# Patient Record
Sex: MALE | Race: WHITE | Employment: OTHER | URBAN - METROPOLITAN AREA
[De-identification: names, ages, dates, MRNs, and addresses within clinical notes are randomized per-mention and may not be internally consistent; named-entity substitution may affect disease eponyms.]

---

## 2018-06-15 ENCOUNTER — HOSPITAL ENCOUNTER (OUTPATIENT)
Age: 63
Discharge: HOME | End: 2018-06-17

## 2018-06-18 PROCEDURE — 88305 TISSUE EXAM BY PATHOLOGIST: CPT

## 2019-03-18 ENCOUNTER — HOSPITAL ENCOUNTER (OUTPATIENT)
Dept: DIABETES SERVICES | Age: 64
Setting detail: THERAPIES SERIES
Discharge: HOME | End: 2019-03-18
Payer: COMMERCIAL

## 2019-03-18 PROCEDURE — G0109 DIAB MANAGE TRN IND/GROUP: HCPCS | Performed by: DIETITIAN, REGISTERED

## 2019-03-19 ENCOUNTER — HOSPITAL ENCOUNTER (OUTPATIENT)
Dept: DIABETES SERVICES | Age: 64
Setting detail: THERAPIES SERIES
Discharge: HOME | End: 2019-03-19
Payer: COMMERCIAL

## 2019-03-19 PROCEDURE — G0109 DIAB MANAGE TRN IND/GROUP: HCPCS

## 2019-03-20 ENCOUNTER — HOSPITAL ENCOUNTER (OUTPATIENT)
Dept: DIABETES SERVICES | Age: 64
Setting detail: THERAPIES SERIES
Discharge: HOME | End: 2019-03-20
Payer: COMMERCIAL

## 2019-03-20 PROCEDURE — 97804 MEDICAL NUTRITION GROUP: CPT

## 2019-03-20 ASSESSMENT — PATIENT HEALTH QUESTIONNAIRE - PHQ9: SUM OF ALL RESPONSES TO PHQ QUESTIONS 1-9: 1

## 2019-03-26 ENCOUNTER — HOSPITAL ENCOUNTER (OUTPATIENT)
Dept: DIABETES SERVICES | Age: 64
Setting detail: THERAPIES SERIES
Discharge: HOME | End: 2019-03-26
Payer: COMMERCIAL

## 2019-03-26 PROCEDURE — G0109 DIAB MANAGE TRN IND/GROUP: HCPCS | Performed by: DIETITIAN, REGISTERED

## 2019-08-21 ENCOUNTER — TELEPHONE (OUTPATIENT)
Dept: ADMINISTRATIVE | Age: 64
End: 2019-08-21

## 2019-10-29 ENCOUNTER — OFFICE VISIT (OUTPATIENT)
Dept: CARDIOLOGY CLINIC | Age: 64
End: 2019-10-29
Payer: COMMERCIAL

## 2019-10-29 VITALS
WEIGHT: 267 LBS | RESPIRATION RATE: 18 BRPM | SYSTOLIC BLOOD PRESSURE: 114 MMHG | HEART RATE: 62 BPM | HEIGHT: 76 IN | DIASTOLIC BLOOD PRESSURE: 76 MMHG | BODY MASS INDEX: 32.51 KG/M2

## 2019-10-29 DIAGNOSIS — E78.00 PURE HYPERCHOLESTEROLEMIA: ICD-10-CM

## 2019-10-29 DIAGNOSIS — I10 ESSENTIAL HYPERTENSION: ICD-10-CM

## 2019-10-29 DIAGNOSIS — R55 SYNCOPE AND COLLAPSE: Primary | ICD-10-CM

## 2019-10-29 DIAGNOSIS — I25.10 CORONARY ARTERY DISEASE INVOLVING NATIVE HEART WITHOUT ANGINA PECTORIS, UNSPECIFIED VESSEL OR LESION TYPE: ICD-10-CM

## 2019-10-29 PROCEDURE — 93000 ELECTROCARDIOGRAM COMPLETE: CPT | Performed by: INTERNAL MEDICINE

## 2019-10-29 PROCEDURE — 99214 OFFICE O/P EST MOD 30 MIN: CPT | Performed by: INTERNAL MEDICINE

## 2019-10-31 ENCOUNTER — HOSPITAL ENCOUNTER (OUTPATIENT)
Dept: CARDIOLOGY | Age: 64
Discharge: HOME | End: 2019-10-31
Payer: COMMERCIAL

## 2019-10-31 VITALS
BODY MASS INDEX: 32.51 KG/M2 | HEIGHT: 76 IN | DIASTOLIC BLOOD PRESSURE: 68 MMHG | SYSTOLIC BLOOD PRESSURE: 136 MMHG | WEIGHT: 267 LBS | HEART RATE: 75 BPM

## 2019-10-31 DIAGNOSIS — I25.10 CORONARY ARTERY DISEASE INVOLVING NATIVE CORONARY ARTERY OF NATIVE HEART WITHOUT ANGINA PECTORIS: Primary | ICD-10-CM

## 2019-10-31 DIAGNOSIS — I25.10 CORONARY ARTERY DISEASE INVOLVING NATIVE HEART WITHOUT ANGINA PECTORIS, UNSPECIFIED VESSEL OR LESION TYPE: ICD-10-CM

## 2019-10-31 DIAGNOSIS — R55 SYNCOPE AND COLLAPSE: ICD-10-CM

## 2019-10-31 LAB
LV EF: 50 %
LVEF MODALITY: NORMAL

## 2019-10-31 PROCEDURE — 93017 CV STRESS TEST TRACING ONLY: CPT

## 2019-10-31 PROCEDURE — 3430000000 HC RX DIAGNOSTIC RADIOPHARMACEUTICAL: Performed by: INTERNAL MEDICINE

## 2019-10-31 PROCEDURE — A9500 TC99M SESTAMIBI: HCPCS | Performed by: INTERNAL MEDICINE

## 2019-10-31 PROCEDURE — 2580000003 HC RX 258: Performed by: INTERNAL MEDICINE

## 2019-10-31 PROCEDURE — 78452 HT MUSCLE IMAGE SPECT MULT: CPT

## 2019-10-31 RX ORDER — SODIUM CHLORIDE 0.9 % (FLUSH) 0.9 %
10 SYRINGE (ML) INJECTION PRN
Status: DISCONTINUED | OUTPATIENT
Start: 2019-10-31 | End: 2019-11-01 | Stop reason: HOSPADM

## 2019-10-31 RX ADMIN — Medication 10 ML: at 09:24

## 2019-10-31 RX ADMIN — Medication 32.9 MILLICURIE: at 09:23

## 2019-10-31 RX ADMIN — Medication 10.1 MILLICURIE: at 07:07

## 2019-10-31 RX ADMIN — Medication 10 ML: at 07:07

## 2019-11-01 ENCOUNTER — TELEPHONE (OUTPATIENT)
Dept: CARDIOLOGY CLINIC | Age: 64
End: 2019-11-01

## 2020-02-08 ENCOUNTER — APPOINTMENT (OUTPATIENT)
Dept: CT IMAGING | Age: 65
DRG: 418 | End: 2020-02-08
Payer: COMMERCIAL

## 2020-02-08 ENCOUNTER — APPOINTMENT (OUTPATIENT)
Dept: ULTRASOUND IMAGING | Age: 65
DRG: 418 | End: 2020-02-08
Payer: COMMERCIAL

## 2020-02-08 ENCOUNTER — HOSPITAL ENCOUNTER (INPATIENT)
Age: 65
LOS: 3 days | Discharge: HOME | DRG: 418 | End: 2020-02-11
Attending: EMERGENCY MEDICINE | Admitting: INTERNAL MEDICINE
Payer: COMMERCIAL

## 2020-02-08 PROBLEM — K81.0 ACUTE CHOLECYSTITIS: Status: ACTIVE | Noted: 2020-02-08

## 2020-02-08 LAB
ALBUMIN SERPL-MCNC: 4.5 G/DL (ref 3.5–5.2)
ALP BLD-CCNC: 43 U/L (ref 40–129)
ALT SERPL-CCNC: 21 U/L (ref 0–40)
ANION GAP SERPL CALCULATED.3IONS-SCNC: 14 MMOL/L (ref 7–16)
AST SERPL-CCNC: 17 U/L (ref 0–39)
BASOPHILS ABSOLUTE: 0.02 E9/L (ref 0–0.2)
BASOPHILS RELATIVE PERCENT: 0.2 % (ref 0–2)
BILIRUB SERPL-MCNC: 0.9 MG/DL (ref 0–1.2)
BILIRUBIN DIRECT: <0.2 MG/DL (ref 0–0.3)
BILIRUBIN, INDIRECT: NORMAL MG/DL (ref 0–1)
BUN BLDV-MCNC: 14 MG/DL (ref 8–23)
CALCIUM SERPL-MCNC: 9.8 MG/DL (ref 8.6–10.2)
CHLORIDE BLD-SCNC: 97 MMOL/L (ref 98–107)
CO2: 26 MMOL/L (ref 22–29)
CREAT SERPL-MCNC: 1.2 MG/DL (ref 0.7–1.2)
EOSINOPHILS ABSOLUTE: 0.01 E9/L (ref 0.05–0.5)
EOSINOPHILS RELATIVE PERCENT: 0.1 % (ref 0–6)
GFR AFRICAN AMERICAN: >60
GFR NON-AFRICAN AMERICAN: >60 ML/MIN/1.73
GLUCOSE BLD-MCNC: 193 MG/DL (ref 74–99)
HCT VFR BLD CALC: 44.7 % (ref 37–54)
HEMOGLOBIN: 15.2 G/DL (ref 12.5–16.5)
IMMATURE GRANULOCYTES #: 0.08 E9/L
IMMATURE GRANULOCYTES %: 0.6 % (ref 0–5)
LACTIC ACID: 2.4 MMOL/L (ref 0.5–2.2)
LIPASE: 15 U/L (ref 13–60)
LYMPHOCYTES ABSOLUTE: 0.71 E9/L (ref 1.5–4)
LYMPHOCYTES RELATIVE PERCENT: 5.4 % (ref 20–42)
MCH RBC QN AUTO: 28.8 PG (ref 26–35)
MCHC RBC AUTO-ENTMCNC: 34 % (ref 32–34.5)
MCV RBC AUTO: 84.8 FL (ref 80–99.9)
MONOCYTES ABSOLUTE: 0.76 E9/L (ref 0.1–0.95)
MONOCYTES RELATIVE PERCENT: 5.8 % (ref 2–12)
NEUTROPHILS ABSOLUTE: 11.54 E9/L (ref 1.8–7.3)
NEUTROPHILS RELATIVE PERCENT: 87.9 % (ref 43–80)
PDW BLD-RTO: 12.7 FL (ref 11.5–15)
PLATELET # BLD: 178 E9/L (ref 130–450)
PMV BLD AUTO: 9.3 FL (ref 7–12)
POTASSIUM SERPL-SCNC: 4.1 MMOL/L (ref 3.5–5)
RBC # BLD: 5.27 E12/L (ref 3.8–5.8)
SODIUM BLD-SCNC: 137 MMOL/L (ref 132–146)
TOTAL PROTEIN: 7 G/DL (ref 6.4–8.3)
TROPONIN: <0.01 NG/ML (ref 0–0.03)
WBC # BLD: 13.1 E9/L (ref 4.5–11.5)

## 2020-02-08 PROCEDURE — 83690 ASSAY OF LIPASE: CPT

## 2020-02-08 PROCEDURE — 2580000003 HC RX 258: Performed by: SURGERY

## 2020-02-08 PROCEDURE — 6360000002 HC RX W HCPCS: Performed by: INTERNAL MEDICINE

## 2020-02-08 PROCEDURE — 2580000003 HC RX 258: Performed by: EMERGENCY MEDICINE

## 2020-02-08 PROCEDURE — 6360000004 HC RX CONTRAST MEDICATION: Performed by: RADIOLOGY

## 2020-02-08 PROCEDURE — 6360000002 HC RX W HCPCS: Performed by: EMERGENCY MEDICINE

## 2020-02-08 PROCEDURE — 2500000003 HC RX 250 WO HCPCS: Performed by: EMERGENCY MEDICINE

## 2020-02-08 PROCEDURE — 1200000000 HC SEMI PRIVATE

## 2020-02-08 PROCEDURE — 6360000002 HC RX W HCPCS: Performed by: SURGERY

## 2020-02-08 PROCEDURE — 80076 HEPATIC FUNCTION PANEL: CPT

## 2020-02-08 PROCEDURE — 96375 TX/PRO/DX INJ NEW DRUG ADDON: CPT

## 2020-02-08 PROCEDURE — 83605 ASSAY OF LACTIC ACID: CPT

## 2020-02-08 PROCEDURE — 85025 COMPLETE CBC W/AUTO DIFF WBC: CPT

## 2020-02-08 PROCEDURE — 76705 ECHO EXAM OF ABDOMEN: CPT

## 2020-02-08 PROCEDURE — 96374 THER/PROPH/DIAG INJ IV PUSH: CPT

## 2020-02-08 PROCEDURE — 36415 COLL VENOUS BLD VENIPUNCTURE: CPT

## 2020-02-08 PROCEDURE — 80048 BASIC METABOLIC PNL TOTAL CA: CPT

## 2020-02-08 PROCEDURE — 84484 ASSAY OF TROPONIN QUANT: CPT

## 2020-02-08 PROCEDURE — 2500000003 HC RX 250 WO HCPCS: Performed by: SURGERY

## 2020-02-08 PROCEDURE — 2580000003 HC RX 258: Performed by: INTERNAL MEDICINE

## 2020-02-08 PROCEDURE — 2580000003 HC RX 258: Performed by: RADIOLOGY

## 2020-02-08 PROCEDURE — 74177 CT ABD & PELVIS W/CONTRAST: CPT

## 2020-02-08 PROCEDURE — 99285 EMERGENCY DEPT VISIT HI MDM: CPT

## 2020-02-08 RX ORDER — METOCLOPRAMIDE HYDROCHLORIDE 5 MG/ML
10 INJECTION INTRAMUSCULAR; INTRAVENOUS ONCE
Status: COMPLETED | OUTPATIENT
Start: 2020-02-08 | End: 2020-02-08

## 2020-02-08 RX ORDER — FENTANYL CITRATE 50 UG/ML
50 INJECTION, SOLUTION INTRAMUSCULAR; INTRAVENOUS ONCE
Status: COMPLETED | OUTPATIENT
Start: 2020-02-08 | End: 2020-02-08

## 2020-02-08 RX ORDER — DIPHENHYDRAMINE HYDROCHLORIDE 50 MG/ML
12.5 INJECTION INTRAMUSCULAR; INTRAVENOUS ONCE
Status: COMPLETED | OUTPATIENT
Start: 2020-02-08 | End: 2020-02-08

## 2020-02-08 RX ORDER — SODIUM CHLORIDE 9 MG/ML
INJECTION, SOLUTION INTRAVENOUS CONTINUOUS
Status: DISCONTINUED | OUTPATIENT
Start: 2020-02-08 | End: 2020-02-11

## 2020-02-08 RX ORDER — ACETAMINOPHEN 325 MG/1
650 TABLET ORAL EVERY 4 HOURS PRN
Status: DISCONTINUED | OUTPATIENT
Start: 2020-02-08 | End: 2020-02-11 | Stop reason: HOSPADM

## 2020-02-08 RX ORDER — FAMOTIDINE 20 MG/1
20 TABLET, FILM COATED ORAL 2 TIMES DAILY
Status: DISCONTINUED | OUTPATIENT
Start: 2020-02-08 | End: 2020-02-11 | Stop reason: HOSPADM

## 2020-02-08 RX ORDER — ONDANSETRON 2 MG/ML
4 INJECTION INTRAMUSCULAR; INTRAVENOUS EVERY 6 HOURS PRN
Status: DISCONTINUED | OUTPATIENT
Start: 2020-02-08 | End: 2020-02-11 | Stop reason: HOSPADM

## 2020-02-08 RX ORDER — POTASSIUM CHLORIDE 20 MEQ/1
40 TABLET, EXTENDED RELEASE ORAL PRN
Status: DISCONTINUED | OUTPATIENT
Start: 2020-02-08 | End: 2020-02-11 | Stop reason: HOSPADM

## 2020-02-08 RX ORDER — NICOTINE POLACRILEX 4 MG
15 LOZENGE BUCCAL PRN
Status: DISCONTINUED | OUTPATIENT
Start: 2020-02-08 | End: 2020-02-10 | Stop reason: SDUPTHER

## 2020-02-08 RX ORDER — ONDANSETRON 2 MG/ML
8 INJECTION INTRAMUSCULAR; INTRAVENOUS ONCE
Status: COMPLETED | OUTPATIENT
Start: 2020-02-08 | End: 2020-02-08

## 2020-02-08 RX ORDER — POTASSIUM CHLORIDE 7.45 MG/ML
10 INJECTION INTRAVENOUS PRN
Status: DISCONTINUED | OUTPATIENT
Start: 2020-02-08 | End: 2020-02-11 | Stop reason: HOSPADM

## 2020-02-08 RX ORDER — SODIUM CHLORIDE 0.9 % (FLUSH) 0.9 %
10 SYRINGE (ML) INJECTION EVERY 12 HOURS SCHEDULED
Status: DISCONTINUED | OUTPATIENT
Start: 2020-02-08 | End: 2020-02-11 | Stop reason: HOSPADM

## 2020-02-08 RX ORDER — SENNA PLUS 8.6 MG/1
1 TABLET ORAL DAILY PRN
Status: DISCONTINUED | OUTPATIENT
Start: 2020-02-08 | End: 2020-02-11 | Stop reason: HOSPADM

## 2020-02-08 RX ORDER — MORPHINE SULFATE 4 MG/ML
6 INJECTION, SOLUTION INTRAMUSCULAR; INTRAVENOUS ONCE
Status: COMPLETED | OUTPATIENT
Start: 2020-02-08 | End: 2020-02-08

## 2020-02-08 RX ORDER — 0.9 % SODIUM CHLORIDE 0.9 %
1000 INTRAVENOUS SOLUTION INTRAVENOUS ONCE
Status: COMPLETED | OUTPATIENT
Start: 2020-02-08 | End: 2020-02-08

## 2020-02-08 RX ORDER — MORPHINE SULFATE 2 MG/ML
2 INJECTION, SOLUTION INTRAMUSCULAR; INTRAVENOUS
Status: DISCONTINUED | OUTPATIENT
Start: 2020-02-08 | End: 2020-02-11 | Stop reason: HOSPADM

## 2020-02-08 RX ORDER — MORPHINE SULFATE 4 MG/ML
4 INJECTION, SOLUTION INTRAMUSCULAR; INTRAVENOUS EVERY 4 HOURS PRN
Status: DISCONTINUED | OUTPATIENT
Start: 2020-02-08 | End: 2020-02-11 | Stop reason: HOSPADM

## 2020-02-08 RX ORDER — DEXTROSE MONOHYDRATE 50 MG/ML
100 INJECTION, SOLUTION INTRAVENOUS PRN
Status: DISCONTINUED | OUTPATIENT
Start: 2020-02-08 | End: 2020-02-10 | Stop reason: SDUPTHER

## 2020-02-08 RX ORDER — SODIUM CHLORIDE 0.9 % (FLUSH) 0.9 %
10 SYRINGE (ML) INJECTION PRN
Status: DISCONTINUED | OUTPATIENT
Start: 2020-02-08 | End: 2020-02-11 | Stop reason: HOSPADM

## 2020-02-08 RX ORDER — DEXTROSE MONOHYDRATE 25 G/50ML
12.5 INJECTION, SOLUTION INTRAVENOUS PRN
Status: DISCONTINUED | OUTPATIENT
Start: 2020-02-08 | End: 2020-02-10 | Stop reason: SDUPTHER

## 2020-02-08 RX ORDER — SODIUM CHLORIDE 0.9 % (FLUSH) 0.9 %
10 SYRINGE (ML) INJECTION PRN
Status: COMPLETED | OUTPATIENT
Start: 2020-02-08 | End: 2020-02-08

## 2020-02-08 RX ADMIN — METOCLOPRAMIDE 10 MG: 5 INJECTION, SOLUTION INTRAMUSCULAR; INTRAVENOUS at 19:39

## 2020-02-08 RX ADMIN — SODIUM CHLORIDE 1000 ML: 9 INJECTION, SOLUTION INTRAVENOUS at 15:23

## 2020-02-08 RX ADMIN — MORPHINE SULFATE 4 MG: 4 INJECTION, SOLUTION INTRAMUSCULAR; INTRAVENOUS at 19:42

## 2020-02-08 RX ADMIN — Medication 10 ML: at 19:39

## 2020-02-08 RX ADMIN — MORPHINE SULFATE 6 MG: 4 INJECTION, SOLUTION INTRAMUSCULAR; INTRAVENOUS at 15:23

## 2020-02-08 RX ADMIN — FAMOTIDINE 20 MG: 10 INJECTION INTRAVENOUS at 15:23

## 2020-02-08 RX ADMIN — CEFTRIAXONE SODIUM 1 G: 1 INJECTION, POWDER, FOR SOLUTION INTRAMUSCULAR; INTRAVENOUS at 17:55

## 2020-02-08 RX ADMIN — FENTANYL CITRATE 50 MCG: 50 INJECTION, SOLUTION INTRAMUSCULAR; INTRAVENOUS at 17:04

## 2020-02-08 RX ADMIN — IOPAMIDOL 110 ML: 755 INJECTION, SOLUTION INTRAVENOUS at 16:10

## 2020-02-08 RX ADMIN — ONDANSETRON 8 MG: 2 INJECTION INTRAMUSCULAR; INTRAVENOUS at 15:23

## 2020-02-08 RX ADMIN — ENOXAPARIN SODIUM 40 MG: 40 INJECTION SUBCUTANEOUS at 19:39

## 2020-02-08 RX ADMIN — Medication 10 ML: at 16:10

## 2020-02-08 RX ADMIN — METRONIDAZOLE 500 MG: 500 INJECTION, SOLUTION INTRAVENOUS at 19:39

## 2020-02-08 RX ADMIN — SODIUM CHLORIDE: 9 INJECTION, SOLUTION INTRAVENOUS at 19:39

## 2020-02-08 RX ADMIN — DIPHENHYDRAMINE HYDROCHLORIDE 12.5 MG: 50 INJECTION, SOLUTION INTRAMUSCULAR; INTRAVENOUS at 19:39

## 2020-02-08 ASSESSMENT — ENCOUNTER SYMPTOMS
SORE THROAT: 0
EYE REDNESS: 0
COLOR CHANGE: 0
BLOOD IN STOOL: 0
SHORTNESS OF BREATH: 0
ABDOMINAL DISTENTION: 1
RECTAL PAIN: 0
ABDOMINAL PAIN: 1
COUGH: 0
FLATUS: 0
CONSTIPATION: 1
HEMATEMESIS: 0
DIARRHEA: 0
ANAL BLEEDING: 0
NAUSEA: 1
EYE PAIN: 0
CHEST TIGHTNESS: 0
VOMITING: 1
TROUBLE SWALLOWING: 0
HEMATOCHEZIA: 0

## 2020-02-08 ASSESSMENT — PAIN SCALES - GENERAL
PAINLEVEL_OUTOF10: 0
PAINLEVEL_OUTOF10: 6
PAINLEVEL_OUTOF10: 8
PAINLEVEL_OUTOF10: 5
PAINLEVEL_OUTOF10: 8
PAINLEVEL_OUTOF10: 8

## 2020-02-08 ASSESSMENT — PAIN DESCRIPTION - PAIN TYPE
TYPE: ACUTE PAIN

## 2020-02-08 ASSESSMENT — PAIN DESCRIPTION - ONSET
ONSET: ON-GOING
ONSET: ON-GOING

## 2020-02-08 ASSESSMENT — PAIN DESCRIPTION - DESCRIPTORS
DESCRIPTORS: BURNING
DESCRIPTORS: ACHING;CONSTANT;DISCOMFORT

## 2020-02-08 ASSESSMENT — PAIN DESCRIPTION - ORIENTATION
ORIENTATION: RIGHT;LEFT
ORIENTATION: MID;UPPER

## 2020-02-08 ASSESSMENT — PAIN DESCRIPTION - PROGRESSION
CLINICAL_PROGRESSION: NOT CHANGED
CLINICAL_PROGRESSION: GRADUALLY WORSENING

## 2020-02-08 ASSESSMENT — PAIN - FUNCTIONAL ASSESSMENT
PAIN_FUNCTIONAL_ASSESSMENT: PREVENTS OR INTERFERES SOME ACTIVE ACTIVITIES AND ADLS
PAIN_FUNCTIONAL_ASSESSMENT: PREVENTS OR INTERFERES SOME ACTIVE ACTIVITIES AND ADLS

## 2020-02-08 ASSESSMENT — PAIN DESCRIPTION - LOCATION
LOCATION: ABDOMEN
LOCATION: ABDOMEN

## 2020-02-08 ASSESSMENT — PAIN DESCRIPTION - FREQUENCY
FREQUENCY: CONTINUOUS
FREQUENCY: CONTINUOUS

## 2020-02-08 NOTE — ED PROVIDER NOTES
Breath sounds: Normal breath sounds. No wheezing or rales. Abdominal:      General: Bowel sounds are normal. There is no distension or abdominal bruit. Palpations: Abdomen is soft. There is no mass. Tenderness: There is abdominal tenderness in the right upper quadrant and epigastric area. There is no guarding or rebound. Positive signs include Weldon's sign. Negative signs include McBurney's sign. Hernia: No hernia is present. Comments: Very tender to palpation in the epigastric and right upper quadrant region. No guarding, no rigidity, bowel sounds are heard throughout the abdomen   Musculoskeletal: Normal range of motion. Lymphadenopathy:      Cervical: No cervical adenopathy. Skin:     General: Skin is warm and dry. Capillary Refill: Capillary refill takes less than 2 seconds. Coloration: Skin is not pale. Findings: No erythema or rash. Neurological:      Mental Status: He is alert and oriented to person, place, and time. Procedures     MDM     ED Course as of Feb 08 1707   Sat Feb 08, 2020   1643 CT shows pericholecystic stranding and inflammatory changes of the fluid on CT scan. No elevation of hepatic biliary or pancreatic enzymes. No signs of stones or obvious blockage. Findings concerning for possible cholecystitis coupled with his leukocytosis and location of his pain and symptoms. Patient reports seeing a general surgeon remotely for a colonoscopy and he cannot remember the name of this person and is not in the medical records. I discussed options for patient preference are going with PCP preference or on-call surgeon. The patient prefers to go with PCPs preference for general surgeon. [JL]   [id number] Spoke with Dr. Juan Fabian, surgery. Will order antibiotics. Will check US study. Will consult PCP regarding admission. [JL]   26 Spoke with Dr. Gordon Melendez, on-call for PCP. She will admit. [JL]   4598 Patient still in pain.   We will give additional pain medication as patient is going to undergo an ultrasound which may worsen his pain due to pressure ultrasound probe. Discussed plan for antibiotics and admission. Patient and family are agreeable with plan. Abdominal exam remains with tenderness without rigidity or guarding. Patient is nontoxic-appearing. [JL]      ED Course User Index  [JL] Taylor Castillo, DO          --------------------------------------------- PAST HISTORY ---------------------------------------------  Past Medical History:  has a past medical history of Arthritis, Chest discomfort, Depression, Depression, Diabetes mellitus ([address] Dizziness, Hearing loss, History of cardiac cath, History of stress test, Hyperlipemia, Hypertension, and Lightheadedness. Past Surgical History:  has a past surgical history that includes cervical fusion (2003); hernia repair; Knee arthroscopy (Left); other surgical history (3/18/2013); Cardiac catheterization; Colonoscopy; and joint replacement (Left, 2013). Social History:  reports that he quit smoking about 19 years ago. He has a 25.00 pack-year smoking history. He has never used smokeless tobacco. He reports that he does not drink alcohol or use drugs. Family History: family history includes Cancer in his mother; Coronary Art Dis (age of onset: 54) in his brother; Heart Attack (age of onset: 54) in his brother; Other in his father. The patients home medications have been reviewed. Allergies: Patient has no known allergies.     -------------------------------------------------- RESULTS -------------------------------------------------    LABS:  Results for orders placed or performed during the hospital encounter of 02/08/20   CBC auto differential   Result Value Ref Range    WBC 13.1 (H) 4.5 - 11.5 E9/L    RBC 5.27 3.80 - 5.80 E12/L    Hemoglobin 15.2 12.5 - 16.5 g/dL    Hematocrit 44.7 37.0 - 54.0 %    MCV 84.8 80.0 - 99.9 fL    MCH 28.8 26.0 - 35.0 pg    MCHC 34.0 32.0 - 34.5 encounter and vital signs as below have been reviewed. Patient Vitals for the past 24 hrs:   BP Temp Temp src Pulse Resp SpO2 Height Weight   02/08/20 1410 (!) 144/71 98.3 °F (36.8 °C) Oral 90 16 96 % 6' 4\" (1.93 m) 263 lb (119.3 kg)       Oxygen Saturation Interpretation: Normal    ------------------------------------------ PROGRESS NOTES ------------------------------------------      Counseling:  I have spoken with the patient and discussed todays results, in addition to providing specific details for the plan of care and counseling regarding the diagnosis and prognosis. Their questions are answered at this time and they are agreeable with the plan of admission.    --------------------------------- ADDITIONAL PROVIDER NOTES ---------------------------------  Consultations:  Time: 1655. Spoke with Dr. Jad Bang, medicine. Discussed case. They will admit the patient. Spoke with Dr. Claire Nguyen, surgery. He will provide consultation. This patient's ED course included: a personal history and physicial examination, re-evaluation prior to disposition, multiple bedside re-evaluations and IV medications    This patient has remained hemodynamically stable during their ED course. Diagnosis:  1. Acute cholecystitis        Disposition:  Patient's disposition: Admit to med/surg floor  Patient's condition is stable.             Saud Clark DO  Resident  02/08/20 2834

## 2020-02-08 NOTE — ED NOTES
Bed:  GARCIA-  Expected date:   Expected time:   Means of arrival:   Comments:  Dmitriy Larson RN  02/08/20 6515

## 2020-02-09 ENCOUNTER — ANESTHESIA EVENT (OUTPATIENT)
Dept: OPERATING ROOM | Age: 65
DRG: 418 | End: 2020-02-09
Payer: COMMERCIAL

## 2020-02-09 ENCOUNTER — APPOINTMENT (OUTPATIENT)
Dept: GENERAL RADIOLOGY | Age: 65
DRG: 418 | End: 2020-02-09
Payer: COMMERCIAL

## 2020-02-09 ENCOUNTER — APPOINTMENT (OUTPATIENT)
Dept: NUCLEAR MEDICINE | Age: 65
DRG: 418 | End: 2020-02-09
Payer: COMMERCIAL

## 2020-02-09 ENCOUNTER — ANESTHESIA (OUTPATIENT)
Dept: OPERATING ROOM | Age: 65
DRG: 418 | End: 2020-02-09
Payer: COMMERCIAL

## 2020-02-09 VITALS — DIASTOLIC BLOOD PRESSURE: 73 MMHG | SYSTOLIC BLOOD PRESSURE: 131 MMHG | OXYGEN SATURATION: 97 % | TEMPERATURE: 101.1 F

## 2020-02-09 LAB
ALBUMIN SERPL-MCNC: 3.7 G/DL (ref 3.5–5.2)
ALP BLD-CCNC: 45 U/L (ref 40–129)
ALT SERPL-CCNC: 189 U/L (ref 0–40)
ANION GAP SERPL CALCULATED.3IONS-SCNC: 11 MMOL/L (ref 7–16)
APTT: 26.7 SEC (ref 24.5–35.1)
AST SERPL-CCNC: 179 U/L (ref 0–39)
BASOPHILS ABSOLUTE: 0.03 E9/L (ref 0–0.2)
BASOPHILS RELATIVE PERCENT: 0.3 % (ref 0–2)
BILIRUB SERPL-MCNC: 1.8 MG/DL (ref 0–1.2)
BUN BLDV-MCNC: 11 MG/DL (ref 8–23)
CALCIUM SERPL-MCNC: 8.8 MG/DL (ref 8.6–10.2)
CHLORIDE BLD-SCNC: 97 MMOL/L (ref 98–107)
CO2: 26 MMOL/L (ref 22–29)
CREAT SERPL-MCNC: 1.3 MG/DL (ref 0.7–1.2)
EOSINOPHILS ABSOLUTE: 0 E9/L (ref 0.05–0.5)
EOSINOPHILS RELATIVE PERCENT: 0 % (ref 0–6)
GFR AFRICAN AMERICAN: >60
GFR NON-AFRICAN AMERICAN: 56 ML/MIN/1.73
GLUCOSE BLD-MCNC: 183 MG/DL (ref 74–99)
HCT VFR BLD CALC: 43.1 % (ref 37–54)
HEMOGLOBIN: 14.3 G/DL (ref 12.5–16.5)
IMMATURE GRANULOCYTES #: 0.05 E9/L
IMMATURE GRANULOCYTES %: 0.5 % (ref 0–5)
INR BLD: 1.4
LACTIC ACID: 2 MMOL/L (ref 0.5–2.2)
LYMPHOCYTES ABSOLUTE: 0.99 E9/L (ref 1.5–4)
LYMPHOCYTES RELATIVE PERCENT: 9.1 % (ref 20–42)
MCH RBC QN AUTO: 28.7 PG (ref 26–35)
MCHC RBC AUTO-ENTMCNC: 33.2 % (ref 32–34.5)
MCV RBC AUTO: 86.4 FL (ref 80–99.9)
METER GLUCOSE: 151 MG/DL (ref 74–99)
METER GLUCOSE: 170 MG/DL (ref 74–99)
MONOCYTES ABSOLUTE: 0.89 E9/L (ref 0.1–0.95)
MONOCYTES RELATIVE PERCENT: 8.2 % (ref 2–12)
NEUTROPHILS ABSOLUTE: 8.87 E9/L (ref 1.8–7.3)
NEUTROPHILS RELATIVE PERCENT: 81.9 % (ref 43–80)
PDW BLD-RTO: 13.1 FL (ref 11.5–15)
PLATELET # BLD: 178 E9/L (ref 130–450)
PMV BLD AUTO: 9.4 FL (ref 7–12)
POTASSIUM REFLEX MAGNESIUM: 4.2 MMOL/L (ref 3.5–5)
PROTHROMBIN TIME: 15.6 SEC (ref 9.3–12.4)
RBC # BLD: 4.99 E12/L (ref 3.8–5.8)
SODIUM BLD-SCNC: 134 MMOL/L (ref 132–146)
TOTAL PROTEIN: 6.3 G/DL (ref 6.4–8.3)
WBC # BLD: 10.8 E9/L (ref 4.5–11.5)

## 2020-02-09 PROCEDURE — 7100000000 HC PACU RECOVERY - FIRST 15 MIN: Performed by: SURGERY

## 2020-02-09 PROCEDURE — 3700000000 HC ANESTHESIA ATTENDED CARE: Performed by: SURGERY

## 2020-02-09 PROCEDURE — 0FT44ZZ RESECTION OF GALLBLADDER, PERCUTANEOUS ENDOSCOPIC APPROACH: ICD-10-PCS | Performed by: SURGERY

## 2020-02-09 PROCEDURE — 7100000001 HC PACU RECOVERY - ADDTL 15 MIN: Performed by: SURGERY

## 2020-02-09 PROCEDURE — 85610 PROTHROMBIN TIME: CPT

## 2020-02-09 PROCEDURE — 83605 ASSAY OF LACTIC ACID: CPT

## 2020-02-09 PROCEDURE — 85730 THROMBOPLASTIN TIME PARTIAL: CPT

## 2020-02-09 PROCEDURE — 6360000002 HC RX W HCPCS: Performed by: SURGERY

## 2020-02-09 PROCEDURE — 2700000000 HC OXYGEN THERAPY PER DAY

## 2020-02-09 PROCEDURE — 82962 GLUCOSE BLOOD TEST: CPT

## 2020-02-09 PROCEDURE — 78226 HEPATOBILIARY SYSTEM IMAGING: CPT

## 2020-02-09 PROCEDURE — 80053 COMPREHEN METABOLIC PANEL: CPT

## 2020-02-09 PROCEDURE — 2720000010 HC SURG SUPPLY STERILE: Performed by: SURGERY

## 2020-02-09 PROCEDURE — 36415 COLL VENOUS BLD VENIPUNCTURE: CPT

## 2020-02-09 PROCEDURE — 3600000014 HC SURGERY LEVEL 4 ADDTL 15MIN: Performed by: SURGERY

## 2020-02-09 PROCEDURE — 2500000003 HC RX 250 WO HCPCS: Performed by: NURSE ANESTHETIST, CERTIFIED REGISTERED

## 2020-02-09 PROCEDURE — 6370000000 HC RX 637 (ALT 250 FOR IP): Performed by: INTERNAL MEDICINE

## 2020-02-09 PROCEDURE — 1200000000 HC SEMI PRIVATE

## 2020-02-09 PROCEDURE — 6360000002 HC RX W HCPCS: Performed by: INTERNAL MEDICINE

## 2020-02-09 PROCEDURE — 87081 CULTURE SCREEN ONLY: CPT

## 2020-02-09 PROCEDURE — 2580000003 HC RX 258: Performed by: SURGERY

## 2020-02-09 PROCEDURE — 3600000004 HC SURGERY LEVEL 4 BASE: Performed by: SURGERY

## 2020-02-09 PROCEDURE — A9537 TC99M MEBROFENIN: HCPCS | Performed by: RADIOLOGY

## 2020-02-09 PROCEDURE — 2500000003 HC RX 250 WO HCPCS: Performed by: SURGERY

## 2020-02-09 PROCEDURE — 88304 TISSUE EXAM BY PATHOLOGIST: CPT

## 2020-02-09 PROCEDURE — 2580000003 HC RX 258: Performed by: INTERNAL MEDICINE

## 2020-02-09 PROCEDURE — 2580000003 HC RX 258: Performed by: NURSE ANESTHETIST, CERTIFIED REGISTERED

## 2020-02-09 PROCEDURE — 0W9F40Z DRAINAGE OF ABDOMINAL WALL WITH DRAINAGE DEVICE, PERCUTANEOUS ENDOSCOPIC APPROACH: ICD-10-PCS | Performed by: SURGERY

## 2020-02-09 PROCEDURE — 6360000002 HC RX W HCPCS: Performed by: NURSE ANESTHETIST, CERTIFIED REGISTERED

## 2020-02-09 PROCEDURE — 2709999900 HC NON-CHARGEABLE SUPPLY: Performed by: SURGERY

## 2020-02-09 PROCEDURE — 3430000000 HC RX DIAGNOSTIC RADIOPHARMACEUTICAL: Performed by: RADIOLOGY

## 2020-02-09 PROCEDURE — 85025 COMPLETE CBC W/AUTO DIFF WBC: CPT

## 2020-02-09 PROCEDURE — 3700000001 HC ADD 15 MINUTES (ANESTHESIA): Performed by: SURGERY

## 2020-02-09 RX ORDER — FENTANYL CITRATE 50 UG/ML
INJECTION, SOLUTION INTRAMUSCULAR; INTRAVENOUS PRN
Status: DISCONTINUED | OUTPATIENT
Start: 2020-02-09 | End: 2020-02-09 | Stop reason: SDUPTHER

## 2020-02-09 RX ORDER — DEXTROSE, SODIUM CHLORIDE, AND POTASSIUM CHLORIDE 5; .45; .15 G/100ML; G/100ML; G/100ML
INJECTION INTRAVENOUS CONTINUOUS
Status: DISCONTINUED | OUTPATIENT
Start: 2020-02-09 | End: 2020-02-11

## 2020-02-09 RX ORDER — SODIUM CHLORIDE 0.9 % (FLUSH) 0.9 %
10 SYRINGE (ML) INJECTION PRN
Status: DISCONTINUED | OUTPATIENT
Start: 2020-02-09 | End: 2020-02-10 | Stop reason: SDUPTHER

## 2020-02-09 RX ORDER — SODIUM CHLORIDE 0.9 % (FLUSH) 0.9 %
10 SYRINGE (ML) INJECTION EVERY 12 HOURS SCHEDULED
Status: DISCONTINUED | OUTPATIENT
Start: 2020-02-09 | End: 2020-02-10 | Stop reason: SDUPTHER

## 2020-02-09 RX ORDER — PROPOFOL 10 MG/ML
INJECTION, EMULSION INTRAVENOUS PRN
Status: DISCONTINUED | OUTPATIENT
Start: 2020-02-09 | End: 2020-02-09 | Stop reason: SDUPTHER

## 2020-02-09 RX ORDER — MIDAZOLAM HYDROCHLORIDE 1 MG/ML
INJECTION INTRAMUSCULAR; INTRAVENOUS PRN
Status: DISCONTINUED | OUTPATIENT
Start: 2020-02-09 | End: 2020-02-09 | Stop reason: SDUPTHER

## 2020-02-09 RX ORDER — DEXAMETHASONE SODIUM PHOSPHATE 4 MG/ML
INJECTION, SOLUTION INTRA-ARTICULAR; INTRALESIONAL; INTRAMUSCULAR; INTRAVENOUS; SOFT TISSUE PRN
Status: DISCONTINUED | OUTPATIENT
Start: 2020-02-09 | End: 2020-02-09 | Stop reason: SDUPTHER

## 2020-02-09 RX ORDER — FENTANYL CITRATE 50 UG/ML
25 INJECTION, SOLUTION INTRAMUSCULAR; INTRAVENOUS EVERY 5 MIN PRN
Status: DISCONTINUED | OUTPATIENT
Start: 2020-02-09 | End: 2020-02-09 | Stop reason: HOSPADM

## 2020-02-09 RX ORDER — ROCURONIUM BROMIDE 10 MG/ML
INJECTION, SOLUTION INTRAVENOUS PRN
Status: DISCONTINUED | OUTPATIENT
Start: 2020-02-09 | End: 2020-02-09 | Stop reason: SDUPTHER

## 2020-02-09 RX ORDER — ONDANSETRON 2 MG/ML
INJECTION INTRAMUSCULAR; INTRAVENOUS PRN
Status: DISCONTINUED | OUTPATIENT
Start: 2020-02-09 | End: 2020-02-09 | Stop reason: SDUPTHER

## 2020-02-09 RX ORDER — HYDROCODONE BITARTRATE AND ACETAMINOPHEN 5; 325 MG/1; MG/1
1 TABLET ORAL EVERY 6 HOURS PRN
Status: DISCONTINUED | OUTPATIENT
Start: 2020-02-09 | End: 2020-02-11 | Stop reason: HOSPADM

## 2020-02-09 RX ORDER — SODIUM CHLORIDE, SODIUM LACTATE, POTASSIUM CHLORIDE, CALCIUM CHLORIDE 600; 310; 30; 20 MG/100ML; MG/100ML; MG/100ML; MG/100ML
INJECTION, SOLUTION INTRAVENOUS CONTINUOUS PRN
Status: DISCONTINUED | OUTPATIENT
Start: 2020-02-09 | End: 2020-02-09 | Stop reason: SDUPTHER

## 2020-02-09 RX ORDER — LIDOCAINE HYDROCHLORIDE 20 MG/ML
INJECTION, SOLUTION EPIDURAL; INFILTRATION; INTRACAUDAL; PERINEURAL PRN
Status: DISCONTINUED | OUTPATIENT
Start: 2020-02-09 | End: 2020-02-09 | Stop reason: SDUPTHER

## 2020-02-09 RX ORDER — ONDANSETRON 2 MG/ML
4 INJECTION INTRAMUSCULAR; INTRAVENOUS EVERY 6 HOURS PRN
Status: DISCONTINUED | OUTPATIENT
Start: 2020-02-09 | End: 2020-02-10 | Stop reason: SDUPTHER

## 2020-02-09 RX ADMIN — SODIUM CHLORIDE, POTASSIUM CHLORIDE, SODIUM LACTATE AND CALCIUM CHLORIDE: 600; 310; 30; 20 INJECTION, SOLUTION INTRAVENOUS at 15:05

## 2020-02-09 RX ADMIN — MORPHINE SULFATE 4 MG: 4 INJECTION, SOLUTION INTRAMUSCULAR; INTRAVENOUS at 09:43

## 2020-02-09 RX ADMIN — LIDOCAINE HYDROCHLORIDE 60 MG: 20 INJECTION, SOLUTION EPIDURAL; INFILTRATION; INTRACAUDAL; PERINEURAL at 15:09

## 2020-02-09 RX ADMIN — DEXAMETHASONE SODIUM PHOSPHATE 8 MG: 4 INJECTION, SOLUTION INTRAMUSCULAR; INTRAVENOUS at 15:15

## 2020-02-09 RX ADMIN — Medication 10 ML: at 20:05

## 2020-02-09 RX ADMIN — POTASSIUM CHLORIDE, DEXTROSE MONOHYDRATE AND SODIUM CHLORIDE: 150; 5; 450 INJECTION, SOLUTION INTRAVENOUS at 17:16

## 2020-02-09 RX ADMIN — Medication 10 ML: at 09:43

## 2020-02-09 RX ADMIN — FENTANYL CITRATE 50 MCG: 50 INJECTION, SOLUTION INTRAMUSCULAR; INTRAVENOUS at 15:09

## 2020-02-09 RX ADMIN — ONDANSETRON HYDROCHLORIDE 4 MG: 2 INJECTION, SOLUTION INTRAMUSCULAR; INTRAVENOUS at 15:15

## 2020-02-09 RX ADMIN — MIDAZOLAM 2 MG: 1 INJECTION INTRAMUSCULAR; INTRAVENOUS at 15:09

## 2020-02-09 RX ADMIN — METRONIDAZOLE 500 MG: 500 INJECTION, SOLUTION INTRAVENOUS at 10:25

## 2020-02-09 RX ADMIN — METRONIDAZOLE 500 MG: 500 INJECTION, SOLUTION INTRAVENOUS at 17:17

## 2020-02-09 RX ADMIN — HYDROMORPHONE HYDROCHLORIDE 1 MG: 1 INJECTION, SOLUTION INTRAMUSCULAR; INTRAVENOUS; SUBCUTANEOUS at 17:23

## 2020-02-09 RX ADMIN — ROCURONIUM BROMIDE 50 MG: 10 SOLUTION INTRAVENOUS at 15:09

## 2020-02-09 RX ADMIN — MORPHINE SULFATE 4 MG: 4 INJECTION, SOLUTION INTRAMUSCULAR; INTRAVENOUS at 20:05

## 2020-02-09 RX ADMIN — CEFTRIAXONE 1 G: 1 INJECTION, POWDER, FOR SOLUTION INTRAMUSCULAR; INTRAVENOUS at 18:20

## 2020-02-09 RX ADMIN — Medication 6 MILLICURIE: at 08:47

## 2020-02-09 RX ADMIN — Medication 10 ML: at 17:23

## 2020-02-09 RX ADMIN — FAMOTIDINE 20 MG: 20 TABLET, FILM COATED ORAL at 20:05

## 2020-02-09 RX ADMIN — FENTANYL CITRATE 100 MCG: 50 INJECTION, SOLUTION INTRAMUSCULAR; INTRAVENOUS at 16:10

## 2020-02-09 RX ADMIN — SUGAMMADEX 400 MG: 100 INJECTION, SOLUTION INTRAVENOUS at 15:57

## 2020-02-09 RX ADMIN — FENTANYL CITRATE 100 MCG: 50 INJECTION, SOLUTION INTRAMUSCULAR; INTRAVENOUS at 15:15

## 2020-02-09 RX ADMIN — ENOXAPARIN SODIUM 40 MG: 40 INJECTION SUBCUTANEOUS at 17:27

## 2020-02-09 RX ADMIN — METRONIDAZOLE 500 MG: 500 INJECTION, SOLUTION INTRAVENOUS at 03:18

## 2020-02-09 RX ADMIN — MORPHINE SULFATE 4 MG: 4 INJECTION, SOLUTION INTRAMUSCULAR; INTRAVENOUS at 01:22

## 2020-02-09 RX ADMIN — PROPOFOL 200 MG: 10 INJECTION, EMULSION INTRAVENOUS at 15:09

## 2020-02-09 ASSESSMENT — PULMONARY FUNCTION TESTS
PIF_VALUE: 1
PIF_VALUE: 20
PIF_VALUE: 23
PIF_VALUE: 2
PIF_VALUE: 24
PIF_VALUE: 20
PIF_VALUE: 24
PIF_VALUE: 5
PIF_VALUE: 19
PIF_VALUE: 23
PIF_VALUE: 1
PIF_VALUE: 29
PIF_VALUE: 2
PIF_VALUE: 0
PIF_VALUE: 17
PIF_VALUE: 22
PIF_VALUE: 1
PIF_VALUE: 1
PIF_VALUE: 0
PIF_VALUE: 15
PIF_VALUE: 24
PIF_VALUE: 1
PIF_VALUE: 23
PIF_VALUE: 24
PIF_VALUE: 23
PIF_VALUE: 24
PIF_VALUE: 1
PIF_VALUE: 23
PIF_VALUE: 18
PIF_VALUE: 2
PIF_VALUE: 1
PIF_VALUE: 17
PIF_VALUE: 1
PIF_VALUE: 4
PIF_VALUE: 1
PIF_VALUE: 17
PIF_VALUE: 26
PIF_VALUE: 1
PIF_VALUE: 1
PIF_VALUE: 22
PIF_VALUE: 24
PIF_VALUE: 3
PIF_VALUE: 2
PIF_VALUE: 17
PIF_VALUE: 2
PIF_VALUE: 4
PIF_VALUE: 1
PIF_VALUE: 17
PIF_VALUE: 1
PIF_VALUE: 1
PIF_VALUE: 2
PIF_VALUE: 19
PIF_VALUE: 23
PIF_VALUE: 0
PIF_VALUE: 24
PIF_VALUE: 23
PIF_VALUE: 22
PIF_VALUE: 23
PIF_VALUE: 22
PIF_VALUE: 24
PIF_VALUE: 22
PIF_VALUE: 22
PIF_VALUE: 1
PIF_VALUE: 13
PIF_VALUE: 23
PIF_VALUE: 1
PIF_VALUE: 1
PIF_VALUE: 22
PIF_VALUE: 22
PIF_VALUE: 1
PIF_VALUE: 24
PIF_VALUE: 2
PIF_VALUE: 2
PIF_VALUE: 1
PIF_VALUE: 1
PIF_VALUE: 24

## 2020-02-09 ASSESSMENT — PAIN DESCRIPTION - DESCRIPTORS
DESCRIPTORS: ACHING;DISCOMFORT;SORE
DESCRIPTORS: ACHING;BURNING
DESCRIPTORS: ACHING;BURNING;DISCOMFORT
DESCRIPTORS: ACHING;DISCOMFORT;SORE

## 2020-02-09 ASSESSMENT — PAIN SCALES - GENERAL
PAINLEVEL_OUTOF10: 10
PAINLEVEL_OUTOF10: 8
PAINLEVEL_OUTOF10: 2
PAINLEVEL_OUTOF10: 8
PAINLEVEL_OUTOF10: 8
PAINLEVEL_OUTOF10: 2
PAINLEVEL_OUTOF10: 0
PAINLEVEL_OUTOF10: 2

## 2020-02-09 ASSESSMENT — PAIN DESCRIPTION - LOCATION
LOCATION: ABDOMEN
LOCATION: ABDOMEN
LOCATION: ABDOMEN;SHOULDER
LOCATION: ABDOMEN
LOCATION: BACK

## 2020-02-09 ASSESSMENT — PAIN DESCRIPTION - PAIN TYPE
TYPE: CHRONIC PAIN
TYPE: ACUTE PAIN
TYPE: SURGICAL PAIN
TYPE: ACUTE PAIN

## 2020-02-09 ASSESSMENT — ENCOUNTER SYMPTOMS
CONSTIPATION: 1
BACK PAIN: 1
BLOOD IN STOOL: 0
SHORTNESS OF BREATH: 0
SORE THROAT: 0
EYE PAIN: 0
VOMITING: 1
ABDOMINAL PAIN: 1
DIARRHEA: 0
EYE ITCHING: 0
APNEA: 0
CHEST TIGHTNESS: 0
WHEEZING: 0
NAUSEA: 1
SINUS PRESSURE: 0

## 2020-02-09 ASSESSMENT — PAIN DESCRIPTION - ONSET
ONSET: ON-GOING

## 2020-02-09 ASSESSMENT — LIFESTYLE VARIABLES: SMOKING_STATUS: 0

## 2020-02-09 ASSESSMENT — PAIN - FUNCTIONAL ASSESSMENT
PAIN_FUNCTIONAL_ASSESSMENT: PREVENTS OR INTERFERES SOME ACTIVE ACTIVITIES AND ADLS
PAIN_FUNCTIONAL_ASSESSMENT: ACTIVITIES ARE NOT PREVENTED

## 2020-02-09 ASSESSMENT — PAIN DESCRIPTION - PROGRESSION
CLINICAL_PROGRESSION: NOT CHANGED

## 2020-02-09 ASSESSMENT — PAIN DESCRIPTION - ORIENTATION
ORIENTATION: MID
ORIENTATION: MID
ORIENTATION: MID;UPPER

## 2020-02-09 ASSESSMENT — PAIN DESCRIPTION - FREQUENCY
FREQUENCY: CONTINUOUS

## 2020-02-09 NOTE — CONSULTS
ascites. Adrenals not enlarged. Kidneys have normal size and cortical thickness and excretion of the contrast. Aorta and IVC appear unremarkable. The ureters are patent. Unremarkable appearance for the bladder, prostate gland seminal vesicles are. Lower lung bases demonstrate no significant findings. The Bone structures demonstrate degenerative changes in the thoracolumbar spine. Findings compatible with acute cholecystitis. Please correlate clinically. Us Gallbladder Ruq    Result Date: 2/8/2020  Clinical indication: Epigastric pain. Acute cholecystitis. COMPARISON: CT abdomen and pelvis February 8, 2020. TECHNIQUE: Real-time and Doppler sonography of the gallbladder was carried out. FINDINGS: Dependent echogenic density within the gallbladder measures 6 mm. Gallbladder wall thickness is normal at 1.2 mm. There is mild prominence of the hepatobiliary tree with common duct measuring 6.7 mm in width. Sonographic Remona Dread sign is negative. Pancreas is not seen. There is no evidence of free fluid or pleural effusion. Cholelithiasis. Borderline dilation of the hepatobiliary tree. No other abnormalities are demonstrated on ultrasound of the gallbladder. 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 If ERCP indicated, to be scheduled. Procedure details for ERCP drawn in detail. Complications including but not limited to pancreatitis, perforation, bleeding or infection are discussed in great detail. Risks, benefits, and alternatives have been explained. Pt has understood the information and has agreed to proceed.   · NPO awaiting HIDA results  · Possible Mary with IOC today per surgery  · Medicate for pain and nausea as ordered per PCP  · Continue Rocephin and Flagyl as ordered per Surgery  · Senna as ordered per PCP  · IV fluids as ordered  · Monitor CMP, CBC, Swapna, Lip  · Continue to monitor    Thank you very much for your consultation. We will follow closely with you. Discussed with Dr. Mark Thomas developed by Dr. Sagar Stephen [id number], FNP-BC 2/9/2020 12:46 PM for Dr. Paola Hart. IN OR FOR CHOLY. WILL FOLLOW.

## 2020-02-09 NOTE — H&P
problems. *    Blood pressure 120/70, pulse 67, temperature 98.7 °F (37.1 °C), temperature source Oral, resp. rate 18, height 6' 4\" (1.93 m), weight 263 lb (119.3 kg), SpO2 97 %. Review of Systems   Constitutional: Positive for activity change. Negative for chills, fatigue and fever. HENT: Negative for mouth sores, sinus pressure and sore throat. Eyes: Negative for pain and itching. Respiratory: Negative for apnea, chest tightness, shortness of breath and wheezing. Cardiovascular: Negative for chest pain, palpitations and leg swelling. Gastrointestinal: Positive for abdominal pain, constipation, nausea and vomiting. Negative for blood in stool and diarrhea. Endocrine: Negative for cold intolerance and heat intolerance. Genitourinary: Negative for dysuria, frequency and hematuria. Musculoskeletal: Positive for back pain. Negative for joint swelling. Skin: Negative for rash and wound. Allergic/Immunologic: Negative for environmental allergies and food allergies. Neurological: Negative for facial asymmetry, light-headedness and numbness. Psychiatric/Behavioral: Negative for behavioral problems and confusion. Physical Exam  Constitutional:       General: He is not in acute distress. Appearance: Normal appearance. He is obese. HENT:      Head: Normocephalic and atraumatic. Right Ear: External ear normal. There is no impacted cerumen. Left Ear: External ear normal. There is no impacted cerumen. Nose: Nose normal.      Mouth/Throat:      Mouth: Mucous membranes are moist.      Pharynx: No oropharyngeal exudate. Eyes:      General:         Right eye: No discharge. Left eye: No discharge. Pupils: Pupils are equal, round, and reactive to light. Neck:      Musculoskeletal: Normal range of motion and neck supple. Cardiovascular:      Rate and Rhythm: Normal rate and regular rhythm. Heart sounds: No murmur.    Pulmonary:      Effort: Pulmonary effort

## 2020-02-09 NOTE — ANESTHESIA POSTPROCEDURE EVALUATION
Department of Anesthesiology  Postprocedure Note    Patient: Will Flaherty  MRN: [id number]  YOB: 1955  Date of evaluation: 2/9/2020  Time:  6:21 PM     Procedure Summary     Date:  02/09/20 Room / Location:  [facility] 01 / [facility]    Anesthesia Start:  3314 Anesthesia Stop:  0405    Procedures:       CHOLECYSTECTOMY LAPAROSCOPIC (N/A Abdomen)      lap cholecystectomy Diagnosis:  (cholelithiasis)    Surgeon:  Gabo Navarro MD Responsible Provider:  Matthias Villavicencio MD    Anesthesia Type:  general ASA Status:  3          Anesthesia Type: general    [place] Phase I: [place] Score: 9    [place] Phase II:      Last vitals: Reviewed and per EMR flowsheets.        Anesthesia Post Evaluation    Patient location during evaluation: PACU  Level of consciousness: awake  Airway patency: patent  Nausea & Vomiting: no nausea and no vomiting  Complications: no  Cardiovascular status: hemodynamically stable  Respiratory status: acceptable  Hydration status: euvolemic

## 2020-02-09 NOTE — ANESTHESIA PRE PROCEDURE
Department of Anesthesiology  Preprocedure Note       Name:  Nolvia Miles   Age:  62 y.o.  :  1955                                          MRN:  [id number]         Date:  2020      Surgeon: Dr. Kamini Hernandez  Procedure: lap cholecystectomy    Medications prior to admission:   Prior to Admission medications    Medication Sig Start Date End Date Taking? Authorizing Provider   metFORMIN (GLUCOPHAGE) 500 MG tablet Take 500 mg by mouth daily (with breakfast)  19  Yes Historical Provider, MD   lisinopril (PRINIVIL;ZESTRIL) 5 MG tablet Take 5 mg by mouth daily. Yes Historical Provider, MD   escitalopram (LEXAPRO) 20 MG tablet Take 40 mg by mouth daily    Yes Historical Provider, MD   atorvastatin (LIPITOR) 20 MG tablet Take 20 mg by mouth daily. Instructed to take morning of surgery with a sip of water   Yes Historical Provider, MD   hydrOXYzine (VISTARIL) 25 MG capsule Take 25 mg by mouth 2 times daily    Yes Historical Provider, MD   aspirin EC 81 MG EC tablet Take 1 tablet by mouth daily.   Patient not taking: Reported on 10/29/2019 3/19/13   Haley Marroquin MD       Current medications:    Current Facility-Administered Medications   Medication Dose Route Frequency Provider Last Rate Last Dose    metronidazole (FLAGYL) 500 mg in NaCl 100 mL IVPB premix  500 mg Intravenous Q8H Sincere Lerner MD   Stopped at 20 1236    cefTRIAXone (ROCEPHIN) 1 g in dextrose 5 % 50 mL IVPB (vial-mate)  1 g Intravenous Q24H Sincere Lerner MD   Stopped at 20 1940    sodium chloride flush 0.9 % injection 10 mL  10 mL Intravenous 2 times per day Hemalatha HILTON Olesya, DO   10 mL at 20 1939    sodium chloride flush 0.9 % injection 10 mL  10 mL Intravenous PRN Hemalatha Dacosta, DO   10 mL at 20 0943    potassium chloride (KLOR-CON M) extended release tablet 40 mEq  40 mEq Oral PRN Hemalatha CANELA Olesya,         Or    potassium bicarb-citric acid (EFFER-K) effervescent tablet 40 mEq  40 mEq Oral PRN Hemalatha E Olesya, DO        Or    potassium chloride 10 mEq/100 mL IVPB (Peripheral Line)  10 mEq Intravenous PRN Hemalatha E Olesya, DO        senna (SENOKOT) tablet 8.6 mg  1 tablet Oral Daily PRN Hemalatha E Olesya, DO        ondansetron (ZOFRAN) injection 4 mg  4 mg Intravenous Q6H PRN Hemalatha E Olesya, DO        famotidine (PEPCID) tablet 20 mg  20 mg Oral BID Hemalatha E Olesya, DO        enoxaparin (LOVENOX) injection 40 mg  40 mg Subcutaneous Daily Hemalatha E Olesya, DO   40 mg at 02/08/20 1939    acetaminophen (TYLENOL) tablet 650 mg  650 mg Oral Q4H PRN Hemalatha E Olesya, DO        0.9 % sodium chloride infusion   Intravenous Continuous Hemalatha E Olesya,  mL/hr at 02/08/20 1939      morphine (PF) injection 2 mg  2 mg Intravenous Q2H PRN Hemalatha E Olesya, DO        morphine sulfate (PF) injection 4 mg  4 mg Intravenous Q4H PRN Hemalatha E Olesya, DO   4 mg at 02/09/20 0943    glucose (GLUTOSE) 40 % oral gel 15 g  15 g Oral PRN Hemalatha E Olesya, DO        dextrose 50 % IV solution  12.5 g Intravenous PRN Hemalatha E Olesya, DO        glucagon (rDNA) injection 1 mg  1 mg Intramuscular PRN Hemalatha E Olesya, DO        dextrose 5 % solution  100 mL/hr Intravenous PRN Hemalatha E Olesya, DO           Allergies:  No Known Allergies    Problem List:    Patient Active Problem List   Diagnosis Code    Left knee DJD M17.12    CAD (coronary artery disease) I25.10    Pure hypercholesterolemia E78.00    Essential hypertension I10    Acute cholecystitis K81.0       Past Medical History:        Diagnosis Date    Arthritis     osteo    Chest discomfort     Depression     Depression     Diabetes mellitus ([facility] [address]     Dizziness     Hearing loss     History of cardiac cath     History of stress test     Hyperlipemia     Hypertension     Lightheadedness        Past Surgical History:        Procedure Laterality Date    CARDIAC CATHETERIZATION      CERVICAL FUSION  2003    COLONOSCOPY      HERNIA REPAIR  JOINT REPLACEMENT Left 2013    knee    KNEE ARTHROSCOPY Left     x3    OTHER SURGICAL HISTORY  3/18/2013    Left total knee arthroplasty       Social History:    Social History     Tobacco Use    Smoking status: Former Smoker     Packs/day: 2.50     Years: 10.00     Pack years: 25.00     Last attempt to quit: 3/12/2000     Years since quittin.9    Smokeless tobacco: Never Used   Substance Use Topics    Alcohol use: No                                Counseling given: Not Answered      Vital Signs (Current):   Vitals:    20 1756 20 1942 20 2150 20 0939   BP: 139/74 133/63 120/70 (!) 141/72   Pulse: 85 59 67 62   Resp: 16    Temp: 98.3 °F (36.8 °C) 98.7 °F (37.1 °C) 98.7 °F (37.1 °C) 99.4 °F (37.4 °C)   TempSrc: Oral Oral Oral Oral   SpO2: 96% 97% 97% 93%   Weight:       Height:                                                  BP Readings from Last 3 Encounters:   20 (!) 141/72   10/31/19 136/68   10/29/19 114/76       NPO Status:                                                                                 BMI:   Wt Readings from Last 3 Encounters:   20 263 lb (119.3 kg)   10/31/19 267 lb (121.1 kg)   10/29/19 267 lb (121.1 kg)     Body mass index is 32.01 kg/m².     CBC:   Lab Results   Component Value Date    WBC 10.8 2020    RBC 4.99 2020    HGB 14.3 2020    HCT 43.1 2020    MCV 86.4 2020    RDW 13.1 2020     2020       CMP:   Lab Results   Component Value Date     2020    K 4.2 2020    CL 97 2020    CO2 26 2020    BUN 11 2020    CREATININE 1.3 2020    GFRAA >60 2020    LABGLOM 56 2020    GLUCOSE 183 2020    PROT 6.3 2020    CALCIUM 8.8 2020    BILITOT 1.8 2020    ALKPHOS 45 2020     2020     2020       POC Tests: No results for input(s): POCGLU, POCNA, POCK, POCCL, POCBUN, POCHEMO, POCHCT in the last

## 2020-02-09 NOTE — CONSULTS
General Surgery Consult    Patient's Name/Date of Birth: Nolvia Miles / 1955    Date: February 9, 2020     Consulting Surgeon: Kamini Hernandez M.D.    PCP: Gera Figueroa MD     Chief Complaint:     HPI:   Nolvia Miles is a 62 y.o. male who presents for evaluation of acute cholecystis. Developed epigastric and RUQ pain Friday night. Never had this before. Also had nausea and vomiting. Denied any GERD or changes in bowel habits.         Past Medical History:   Diagnosis Date    Arthritis     osteo    Chest discomfort     Depression     Depression     Diabetes mellitus (HCC)     Dizziness     Hearing loss     History of cardiac cath     History of stress test     Hyperlipemia     Hypertension     Lightheadedness        Past Surgical History:   Procedure Laterality Date    CARDIAC CATHETERIZATION      CERVICAL FUSION  2003    COLONOSCOPY      HERNIA REPAIR      JOINT REPLACEMENT Left 2013    knee    KNEE ARTHROSCOPY Left     x3    OTHER SURGICAL HISTORY  3/18/2013    Left total knee arthroplasty       Current Facility-Administered Medications   Medication Dose Route Frequency Provider Last Rate Last Dose    metronidazole (FLAGYL) 500 mg in NaCl 100 mL IVPB premix  500 mg Intravenous Q8H Sincere Lerner MD   Stopped at 02/09/20 1236    cefTRIAXone (ROCEPHIN) 1 g in dextrose 5 % 50 mL IVPB (vial-mate)  1 g Intravenous Q24H Sincere Lerner MD   Stopped at 02/08/20 1940    sodium chloride flush 0.9 % injection 10 mL  10 mL Intravenous 2 times per day Hemalatha E Olesya, DO   10 mL at 02/08/20 1939    sodium chloride flush 0.9 % injection 10 mL  10 mL Intravenous PRN Hemalatha E Olesya, DO   10 mL at 02/09/20 0943    potassium chloride (KLOR-CON M) extended release tablet 40 mEq  40 mEq Oral PRN Hemalatha E Olesya, DO        Or    potassium bicarb-citric acid (EFFER-K) effervescent tablet 40 mEq  40 mEq Oral PRN Hemalatha E Olesya, DO        Or    potassium chloride 10 mEq/100 mL IVPB (Peripheral Line)  10 mEq Intravenous PRN Hemalatha E Olesya, DO        senna (SENOKOT) tablet 8.6 mg  1 tablet Oral Daily PRN Hemalatha E Olesya, DO        ondansetron (ZOFRAN) injection 4 mg  4 mg Intravenous Q6H PRN Hemalatha E Olesya, DO        famotidine (PEPCID) tablet 20 mg  20 mg Oral BID Hemalatha E Olesya, DO        enoxaparin (LOVENOX) injection 40 mg  40 mg Subcutaneous Daily Hemalatha E Olesya, DO   40 mg at 02/08/20 1939    acetaminophen (TYLENOL) tablet 650 mg  650 mg Oral Q4H PRN Hemalatha E Olesya, DO        0.9 % sodium chloride infusion   Intravenous Continuous Hemalatha E Olesya,  mL/hr at 02/08/20 1939      morphine (PF) injection 2 mg  2 mg Intravenous Q2H PRN Hemalatha E Olesya, DO        morphine sulfate (PF) injection 4 mg  4 mg Intravenous Q4H PRN Hemalatha E Olesya, DO   4 mg at 02/09/20 0943    glucose (GLUTOSE) 40 % oral gel 15 g  15 g Oral PRN Hemalatha E Olesya, DO        dextrose 50 % IV solution  12.5 g Intravenous PRN Hemalatha E Olesya, DO        glucagon (rDNA) injection 1 mg  1 mg Intramuscular PRN Hemalatha E Olesya, DO        dextrose 5 % solution  100 mL/hr Intravenous PRN Hemalatha E Olesya, DO           No Known Allergies    Family History   Problem Relation Age of Onset    Cancer Mother     Other Father         aneurysm    Heart Attack Brother 54    Coronary Art Dis Brother 54        ACB       Social History     Socioeconomic History    Marital status:      Spouse name: Not on file    Number of children: Not on file    Years of education: Not on file    Highest education level: Not on file   Occupational History    Not on file   Social Needs    Financial resource strain: Not on file    Food insecurity:     Worry: Not on file     Inability: Not on file    Transportation needs:     Medical: Not on file     Non-medical: Not on file   Tobacco Use    Smoking status: Former Smoker     Packs/day: 2.50     Years: 10.00     Pack years: 25.00     Last attempt to quit: 3/12/2000     Years since quittin.9    Smokeless tobacco: Never Used   Substance and Sexual Activity    Alcohol use: No    Drug use: No    Sexual activity: Not on file   Lifestyle    Physical activity:     Days per week: Not on file     Minutes per session: Not on file    Stress: Not on file   Relationships    Social connections:     Talks on phone: Not on file     Gets together: Not on file     Attends [employer] service: Not on file     Active member of club or organization: Not on file     Attends meetings of clubs or organizations: Not on file     Relationship status: Not on file    Intimate partner violence:     Fear of current or ex partner: Not on file     Emotionally abused: Not on file     Physically abused: Not on file     Forced sexual activity: Not on file   Other Topics Concern    Not on file   Social History Narrative    Not on file           Review of Systems  Negative times ten except what was stated in HPI and PMH    Physical exam:   BP (!) 141/72   Pulse 62   Temp 99.4 °F (37.4 °C) (Oral)   Resp 18   Ht 6' 4\" (1.93 m)   Wt 263 lb (119.3 kg)   SpO2 93%   BMI 32.01 kg/m²   General appearance: AAOx3, NAD  Head: NCAT, PERRLA, EOMI, red conjunctiva  Neck: supple, no masses  Lungs: CTAB, equal chest rise bilateral  Heart: Reg rate  Abdomen: soft, epigastric tender, nondistended, no masses or organomegaly, no palpable hernias or defects, +bowel sounds, surgical scars were not present.   Skin; no lesions  Gu: no cva tenderness  Extremities: extremities normal, atraumatic, no cyanosis or edema      Radiology:  CT abdomen/pelvis:     Assessment:  62 y.o. male with acute cholecystitis     Plan:  IV ab  OR for lap maldonado  Risks, benefits, alternatives, complications, cbd injury discussed       Frandy Arnold MD  2020  2:01 PM

## 2020-02-09 NOTE — PROGRESS NOTES
1610 pt admitted to stage one rcovry for or pt placed on monitor vs and assessment obtained  1630 Nursing Transfer Note    Data:  Summary of patients progress: general anesthesia recovery    Reason for transfer: PACU discharge criteria met, transferred to next level of care. Action:  Explained reason for transfer to Patient/Family  Report given to: rn, using RN Handoff Navigator.   Mode of transportation: Cart    Response:  RN Recommendations:continuation of care and pain control

## 2020-02-10 PROBLEM — E11.9 TYPE 2 DIABETES MELLITUS (HCC): Chronic | Status: ACTIVE | Noted: 2020-02-10

## 2020-02-10 LAB
ALBUMIN SERPL-MCNC: 4 G/DL (ref 3.5–5.2)
ALP BLD-CCNC: 48 U/L (ref 40–129)
ALT SERPL-CCNC: 132 U/L (ref 0–40)
AMYLASE: 29 U/L (ref 20–100)
ANION GAP SERPL CALCULATED.3IONS-SCNC: 10 MMOL/L (ref 7–16)
AST SERPL-CCNC: 59 U/L (ref 0–39)
ATYPICAL LYMPHOCYTE RELATIVE PERCENT: 1.7 % (ref 0–4)
BASOPHILS ABSOLUTE: 0 E9/L (ref 0–0.2)
BASOPHILS RELATIVE PERCENT: 0 % (ref 0–2)
BILIRUB SERPL-MCNC: 1.2 MG/DL (ref 0–1.2)
BUN BLDV-MCNC: 15 MG/DL (ref 8–23)
BURR CELLS: ABNORMAL
CALCIUM SERPL-MCNC: 9.1 MG/DL (ref 8.6–10.2)
CHLORIDE BLD-SCNC: 96 MMOL/L (ref 98–107)
CO2: 27 MMOL/L (ref 22–29)
CREAT SERPL-MCNC: 1.2 MG/DL (ref 0.7–1.2)
EOSINOPHILS ABSOLUTE: 0 E9/L (ref 0.05–0.5)
EOSINOPHILS RELATIVE PERCENT: 0 % (ref 0–6)
GFR AFRICAN AMERICAN: >60
GFR NON-AFRICAN AMERICAN: >60 ML/MIN/1.73
GLUCOSE BLD-MCNC: 189 MG/DL (ref 74–99)
HCT VFR BLD CALC: 43 % (ref 37–54)
HEMOGLOBIN: 14.5 G/DL (ref 12.5–16.5)
LIPASE: 9 U/L (ref 13–60)
LYMPHOCYTES ABSOLUTE: 0.36 E9/L (ref 1.5–4)
LYMPHOCYTES RELATIVE PERCENT: 0 % (ref 20–42)
MCH RBC QN AUTO: 30 PG (ref 26–35)
MCHC RBC AUTO-ENTMCNC: 33.7 % (ref 32–34.5)
MCV RBC AUTO: 89 FL (ref 80–99.9)
METER GLUCOSE: 150 MG/DL (ref 74–99)
METER GLUCOSE: 165 MG/DL (ref 74–99)
METER GLUCOSE: 180 MG/DL (ref 74–99)
METER GLUCOSE: 197 MG/DL (ref 74–99)
MONOCYTES ABSOLUTE: 1.82 E9/L (ref 0.1–0.95)
MONOCYTES RELATIVE PERCENT: 9.6 % (ref 2–12)
NEUTROPHILS ABSOLUTE: 16.2 E9/L (ref 1.8–7.3)
NEUTROPHILS RELATIVE PERCENT: 88.7 % (ref 43–80)
NUCLEATED RED BLOOD CELLS: 0 /100 WBC
OVALOCYTES: ABNORMAL
PDW BLD-RTO: 13.3 FL (ref 11.5–15)
PLATELET # BLD: 220 E9/L (ref 130–450)
PMV BLD AUTO: 9.5 FL (ref 7–12)
POLYCHROMASIA: ABNORMAL
POTASSIUM REFLEX MAGNESIUM: 4.1 MMOL/L (ref 3.5–5)
RBC # BLD: 4.83 E12/L (ref 3.8–5.8)
SODIUM BLD-SCNC: 133 MMOL/L (ref 132–146)
TOTAL PROTEIN: 6.9 G/DL (ref 6.4–8.3)
WBC # BLD: 18.2 E9/L (ref 4.5–11.5)

## 2020-02-10 PROCEDURE — 6360000002 HC RX W HCPCS: Performed by: SURGERY

## 2020-02-10 PROCEDURE — 36415 COLL VENOUS BLD VENIPUNCTURE: CPT

## 2020-02-10 PROCEDURE — 6370000000 HC RX 637 (ALT 250 FOR IP): Performed by: SURGERY

## 2020-02-10 PROCEDURE — 80053 COMPREHEN METABOLIC PANEL: CPT

## 2020-02-10 PROCEDURE — 83690 ASSAY OF LIPASE: CPT

## 2020-02-10 PROCEDURE — 2580000003 HC RX 258: Performed by: SURGERY

## 2020-02-10 PROCEDURE — 82962 GLUCOSE BLOOD TEST: CPT

## 2020-02-10 PROCEDURE — 82150 ASSAY OF AMYLASE: CPT

## 2020-02-10 PROCEDURE — 2580000003 HC RX 258: Performed by: INTERNAL MEDICINE

## 2020-02-10 PROCEDURE — 1200000000 HC SEMI PRIVATE

## 2020-02-10 PROCEDURE — 6370000000 HC RX 637 (ALT 250 FOR IP): Performed by: INTERNAL MEDICINE

## 2020-02-10 PROCEDURE — 2500000003 HC RX 250 WO HCPCS: Performed by: SURGERY

## 2020-02-10 PROCEDURE — 6370000000 HC RX 637 (ALT 250 FOR IP): Performed by: STUDENT IN AN ORGANIZED HEALTH CARE EDUCATION/TRAINING PROGRAM

## 2020-02-10 PROCEDURE — 6360000002 HC RX W HCPCS: Performed by: INTERNAL MEDICINE

## 2020-02-10 PROCEDURE — 85025 COMPLETE CBC W/AUTO DIFF WBC: CPT

## 2020-02-10 RX ORDER — DEXTROSE MONOHYDRATE 50 MG/ML
100 INJECTION, SOLUTION INTRAVENOUS PRN
Status: DISCONTINUED | OUTPATIENT
Start: 2020-02-10 | End: 2020-02-11 | Stop reason: HOSPADM

## 2020-02-10 RX ORDER — DEXTROSE MONOHYDRATE 25 G/50ML
12.5 INJECTION, SOLUTION INTRAVENOUS PRN
Status: DISCONTINUED | OUTPATIENT
Start: 2020-02-10 | End: 2020-02-11 | Stop reason: HOSPADM

## 2020-02-10 RX ORDER — NICOTINE POLACRILEX 4 MG
15 LOZENGE BUCCAL PRN
Status: DISCONTINUED | OUTPATIENT
Start: 2020-02-10 | End: 2020-02-11 | Stop reason: HOSPADM

## 2020-02-10 RX ORDER — BISACODYL 10 MG
10 SUPPOSITORY, RECTAL RECTAL ONCE
Status: COMPLETED | OUTPATIENT
Start: 2020-02-10 | End: 2020-02-10

## 2020-02-10 RX ADMIN — CEFTRIAXONE 1 G: 1 INJECTION, POWDER, FOR SOLUTION INTRAMUSCULAR; INTRAVENOUS at 17:36

## 2020-02-10 RX ADMIN — FAMOTIDINE 20 MG: 20 TABLET, FILM COATED ORAL at 08:25

## 2020-02-10 RX ADMIN — POTASSIUM CHLORIDE, DEXTROSE MONOHYDRATE AND SODIUM CHLORIDE: 150; 5; 450 INJECTION, SOLUTION INTRAVENOUS at 08:23

## 2020-02-10 RX ADMIN — METRONIDAZOLE 500 MG: 500 INJECTION, SOLUTION INTRAVENOUS at 18:17

## 2020-02-10 RX ADMIN — INSULIN LISPRO 1 UNITS: 100 INJECTION, SOLUTION INTRAVENOUS; SUBCUTANEOUS at 08:25

## 2020-02-10 RX ADMIN — METRONIDAZOLE 500 MG: 500 INJECTION, SOLUTION INTRAVENOUS at 03:36

## 2020-02-10 RX ADMIN — HYDROMORPHONE HYDROCHLORIDE 1 MG: 1 INJECTION, SOLUTION INTRAMUSCULAR; INTRAVENOUS; SUBCUTANEOUS at 18:46

## 2020-02-10 RX ADMIN — BISACODYL 10 MG: 10 SUPPOSITORY RECTAL at 08:25

## 2020-02-10 RX ADMIN — INSULIN LISPRO 1 UNITS: 100 INJECTION, SOLUTION INTRAVENOUS; SUBCUTANEOUS at 20:52

## 2020-02-10 RX ADMIN — SENNOSIDES 8.6 MG: 8.6 TABLET, FILM COATED ORAL at 18:19

## 2020-02-10 RX ADMIN — HYDROCODONE BITARTRATE AND ACETAMINOPHEN 1 TABLET: 5; 325 TABLET ORAL at 16:17

## 2020-02-10 RX ADMIN — INSULIN LISPRO 1 UNITS: 100 INJECTION, SOLUTION INTRAVENOUS; SUBCUTANEOUS at 17:32

## 2020-02-10 RX ADMIN — HYDROCODONE BITARTRATE AND ACETAMINOPHEN 1 TABLET: 5; 325 TABLET ORAL at 01:35

## 2020-02-10 RX ADMIN — Medication 10 ML: at 22:53

## 2020-02-10 RX ADMIN — FAMOTIDINE 20 MG: 20 TABLET, FILM COATED ORAL at 20:51

## 2020-02-10 RX ADMIN — HYDROMORPHONE HYDROCHLORIDE 1 MG: 1 INJECTION, SOLUTION INTRAMUSCULAR; INTRAVENOUS; SUBCUTANEOUS at 04:57

## 2020-02-10 RX ADMIN — MORPHINE SULFATE 4 MG: 4 INJECTION, SOLUTION INTRAMUSCULAR; INTRAVENOUS at 08:30

## 2020-02-10 RX ADMIN — INSULIN LISPRO 1 UNITS: 100 INJECTION, SOLUTION INTRAVENOUS; SUBCUTANEOUS at 12:17

## 2020-02-10 RX ADMIN — METRONIDAZOLE 500 MG: 500 INJECTION, SOLUTION INTRAVENOUS at 10:48

## 2020-02-10 RX ADMIN — HYDROMORPHONE HYDROCHLORIDE 1 MG: 1 INJECTION, SOLUTION INTRAMUSCULAR; INTRAVENOUS; SUBCUTANEOUS at 00:15

## 2020-02-10 ASSESSMENT — PAIN DESCRIPTION - LOCATION
LOCATION: ABDOMEN

## 2020-02-10 ASSESSMENT — PAIN DESCRIPTION - PAIN TYPE
TYPE: ACUTE PAIN;SURGICAL PAIN
TYPE: SURGICAL PAIN
TYPE: ACUTE PAIN;SURGICAL PAIN

## 2020-02-10 ASSESSMENT — PAIN SCALES - GENERAL
PAINLEVEL_OUTOF10: 8
PAINLEVEL_OUTOF10: 7
PAINLEVEL_OUTOF10: 8
PAINLEVEL_OUTOF10: 9
PAINLEVEL_OUTOF10: 9
PAINLEVEL_OUTOF10: 5
PAINLEVEL_OUTOF10: 4
PAINLEVEL_OUTOF10: 0
PAINLEVEL_OUTOF10: 8
PAINLEVEL_OUTOF10: 8

## 2020-02-10 ASSESSMENT — PAIN - FUNCTIONAL ASSESSMENT
PAIN_FUNCTIONAL_ASSESSMENT: PREVENTS OR INTERFERES SOME ACTIVE ACTIVITIES AND ADLS
PAIN_FUNCTIONAL_ASSESSMENT: ACTIVITIES ARE NOT PREVENTED
PAIN_FUNCTIONAL_ASSESSMENT: ACTIVITIES ARE NOT PREVENTED

## 2020-02-10 ASSESSMENT — PAIN DESCRIPTION - FREQUENCY
FREQUENCY: CONTINUOUS

## 2020-02-10 ASSESSMENT — PAIN DESCRIPTION - DESCRIPTORS
DESCRIPTORS: SHARP
DESCRIPTORS: CONSTANT;ACHING;DISCOMFORT
DESCRIPTORS: CONSTANT;ACHING;DISCOMFORT
DESCRIPTORS: SHOOTING;STABBING
DESCRIPTORS: SHARP;STABBING

## 2020-02-10 ASSESSMENT — PAIN DESCRIPTION - ONSET
ONSET: ON-GOING

## 2020-02-10 ASSESSMENT — PAIN DESCRIPTION - ORIENTATION
ORIENTATION: RIGHT
ORIENTATION: RIGHT
ORIENTATION: RIGHT;MID
ORIENTATION: MID
ORIENTATION: RIGHT;MID

## 2020-02-10 ASSESSMENT — PAIN DESCRIPTION - PROGRESSION
CLINICAL_PROGRESSION: GRADUALLY WORSENING
CLINICAL_PROGRESSION: NOT CHANGED
CLINICAL_PROGRESSION: GRADUALLY WORSENING
CLINICAL_PROGRESSION: NOT CHANGED

## 2020-02-10 NOTE — PROGRESS NOTES
Subjective: The patient is awake and alert. No problems overnight. Denies chest pain, angina, and dyspnea. + RUQ discomfort after lap choley yesterday. No nausea or vomiting. No flatus or BM. Objective:    /83   Pulse 82   Temp 99.2 °F (37.3 °C) (Oral)   Resp 18   Ht 6' 4\" (1.93 m)   Wt 263 lb (119.3 kg)   SpO2 91%   BMI 32.01 kg/m²   Neck: No goiter, bruit, or LA  Heart:  RRR, no murmurs, gallops, or rubs.   Lungs:  CTA bilaterally, no wheeze, rales or rhonchi  Abd: bowel sounds decreased, + post-op tenderness RUQ, + RUQ drain in place, mildly distended,  no masses  Extrem:  No clubbing, cyanosis, or edema, 2+ peripheral pulses, FROM    CBC:   Lab Results   Component Value Date    WBC 10.8 02/09/2020    RBC 4.99 02/09/2020    HGB 14.3 02/09/2020    HCT 43.1 02/09/2020    MCV 86.4 02/09/2020    MCH 28.7 02/09/2020    MCHC 33.2 02/09/2020    RDW 13.1 02/09/2020     02/09/2020    MPV 9.4 02/09/2020     CMP:    Lab Results   Component Value Date     02/09/2020    K 4.2 02/09/2020    CL 97 02/09/2020    CO2 26 02/09/2020    BUN 11 02/09/2020    CREATININE 1.3 02/09/2020    GFRAA >60 02/09/2020    LABGLOM 56 02/09/2020    GLUCOSE 183 02/09/2020    PROT 6.3 02/09/2020    LABALBU 3.7 02/09/2020    CALCIUM 8.8 02/09/2020    BILITOT 1.8 02/09/2020    ALKPHOS 45 02/09/2020     02/09/2020     02/09/2020          Current Facility-Administered Medications:     insulin lispro (HUMALOG) injection vial 0-6 Units, 0-6 Units, Subcutaneous, TID WC, Yulissa Pizano MD    insulin lispro (HUMALOG) injection vial 0-3 Units, 0-3 Units, Subcutaneous, Nightly, Yulissa Pizano MD    glucose (GLUTOSE) 40 % oral gel 15 g, 15 g, Oral, PRN, Yulissa Pizano MD    dextrose 50 % IV solution, 12.5 g, Intravenous, PRN, Yulissa Pizano MD    glucagon (rDNA) injection 1 mg, 1 mg, Intramuscular, PRN, Yulissa Pizano MD    dextrose 5 % solution, 100 mL/hr, Intravenous, PRN, Nelwyn Sole, MD    dextrose 5 % and 0.45 % NaCl with KCl 20 mEq infusion, , Intravenous, Continuous, Suad Mcmillan MD, Last Rate: 100 mL/hr at 02/09/20 1716    HYDROmorphone (DILAUDID) injection 1 mg, 1 mg, Intravenous, Q3H PRN, Suad Mcmillan MD, 1 mg at 02/10/20 0457    cefTRIAXone (ROCEPHIN) 1 g in dextrose 5 % 50 mL IVPB (vial-mate), 1 g, Intravenous, Q24H, Suad Mcmillan MD, Stopped at 02/09/20 1850    metronidazole (FLAGYL) 500 mg in NaCl 100 mL IVPB premix, 500 mg, Intravenous, Q8H, Suad Mcmillan MD, Stopped at 02/10/20 0450    HYDROcodone-acetaminophen (NORCO) 5-325 MG per tablet 1 tablet, 1 tablet, Oral, Q6H PRN, Suad Mcmillan MD, 1 tablet at 02/10/20 0135    sodium chloride flush 0.9 % injection 10 mL, 10 mL, Intravenous, 2 times per day, Hemalatha Dacosta, DO, 10 mL at 02/09/20 2005    sodium chloride flush 0.9 % injection 10 mL, 10 mL, Intravenous, PRN, Hemalatha Dacosta, DO, 10 mL at 02/09/20 1723    potassium chloride (KLOR-CON M) extended release tablet 40 mEq, 40 mEq, Oral, PRN **OR** potassium bicarb-citric acid (EFFER-K) effervescent tablet 40 mEq, 40 mEq, Oral, PRN **OR** potassium chloride 10 mEq/100 mL IVPB (Peripheral Line), 10 mEq, Intravenous, PRN, Hemalatha Dacosta, DO    senna (SENOKOT) tablet 8.6 mg, 1 tablet, Oral, Daily PRN, Hemalatha Dacosta, DO    ondansetron (ZOFRAN) injection 4 mg, 4 mg, Intravenous, Q6H PRN, Hemalatha Dacosta, DO    famotidine (PEPCID) tablet 20 mg, 20 mg, Oral, BID, Hemalatha Dacosta, DO, 20 mg at 02/09/20 2005    enoxaparin (LOVENOX) injection 40 mg, 40 mg, Subcutaneous, Daily, Hemalatha Dacosta DO, 40 mg at 02/08/20 1939    acetaminophen (TYLENOL) tablet 650 mg, 650 mg, Oral, Q4H PRN, Hemalatha Dacosta DO    0.9 % sodium chloride infusion, , Intravenous, Continuous, Hemalatha Dacosta DO, Stopped at 02/10/20 0458    morphine (PF) injection 2 mg, 2 mg, Intravenous, Q2H PRN, Hemalatha Dacosta DO    morphine sulfate (PF) injection 4 mg, 4 mg, Intravenous, Q4H

## 2020-02-10 NOTE — PROGRESS NOTES
mL, 2 times per day  sodium chloride flush 0.9 % injection 10 mL, PRN  potassium chloride (KLOR-CON M) extended release tablet 40 mEq, PRN    Or  potassium bicarb-citric acid (EFFER-K) effervescent tablet 40 mEq, PRN    Or  potassium chloride 10 mEq/100 mL IVPB (Peripheral Line), PRN  senna (SENOKOT) tablet 8.6 mg, Daily PRN  ondansetron (ZOFRAN) injection 4 mg, Q6H PRN  famotidine (PEPCID) tablet 20 mg, BID  enoxaparin (LOVENOX) injection 40 mg, Daily  acetaminophen (TYLENOL) tablet 650 mg, Q4H PRN  0.9 % sodium chloride infusion, Continuous  morphine (PF) injection 2 mg, Q2H PRN  morphine sulfate (PF) injection 4 mg, Q4H PRN         Data Review  CBC:   Lab Results   Component Value Date    WBC 18.2 02/10/2020    RBC 4.83 02/10/2020    HGB 14.5 02/10/2020    HCT 43.0 02/10/2020    MCV 89.0 02/10/2020    MCH 30.0 02/10/2020    MCHC 33.7 02/10/2020    RDW 13.3 02/10/2020     02/10/2020    MPV 9.5 02/10/2020     CMP:    Lab Results   Component Value Date     02/10/2020    K 4.1 02/10/2020    CL 96 02/10/2020    CO2 27 02/10/2020    BUN 15 02/10/2020    CREATININE 1.2 02/10/2020    GFRAA >60 02/10/2020    LABGLOM >60 02/10/2020    GLUCOSE 189 02/10/2020    PROT 6.9 02/10/2020    LABALBU 4.0 02/10/2020    CALCIUM 9.1 02/10/2020    BILITOT 1.2 02/10/2020    ALKPHOS 48 02/10/2020    AST 59 02/10/2020     02/10/2020     Hepatic Function Panel:    Lab Results   Component Value Date    ALKPHOS 48 02/10/2020     02/10/2020    AST 59 02/10/2020    PROT 6.9 02/10/2020    BILITOT 1.2 02/10/2020    BILIDIR <0.2 2020    IBILI see below 2020    LABALBU 4.0 02/10/2020     PT/INR:    Lab Results   Component Value Date    PROTIME 15.6 2020    INR 1.4 2020     Nm Hepatobiliary    Result Date: 2020  Patient MRN: [id number] : 1955 Age:  62 years Gender: Male Order Date: 2020 6:00 PM Exam: NM HEPATOBILIARY Number of Images: 13 views Indication:  Acute cholecystitis Comparison: Gallbladder ultrasound February, 2020 Technique: The patient was injected with 7.9 mCi of 99m Tc mebrofenin. Hepatic uptake was uniform The central intrahepatic biliary tree was visualized by 10 minutes. The gallbladder is never visualized, consistent with cystic duct obstruction and acute cholecystitis. Small bowel tracer distribution showed progressive antegrade tracer passage to the distal small bowel during this study. Normal hepatic function and patency of the common bile duct, but nonvisualization of the gallbladder at 4 hours suggests cholecystitis. Assessment:     ? Cholecystitis -surgery following  ? Elevated LFTs/john  ? Leukocytosis  ? Abdominal pain-epigastric, RUQ and radiating to back  ? Nausea and vomiting  ? Chills with subjective fever   ? Constipation, new   ? Hx Ascending colon polyp - sessile serrated  ? 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 Hx tobacco abuse  ? S/P lap subtotal maldonado 2/9/20 with Dr. Rob Masters with SHEFALI placement    Plan:       ? No indication for ERCP at this time  ? Diet per General Surgery orders   ? Medicate for  nausea as ordered per PCP  ? Continue Rocephin and Flagyl as ordered per Surgery  ? Senna as ordered per PCP  ? IV fluids as ordered  ? Monitor CMP, CBC, Swapna, Lip  ?  Continue to monitor    Discussed with Dr. Queen Erps per Dr. Shankar Kamara, NP-C 2/10/2020 12:34 PM For Dr. Mian Thomason

## 2020-02-10 NOTE — OP NOTE
[id number] [address]                                OPERATIVE REPORT    PATIENT NAME: Kana Thurston                     :        1955  MED REC NO:   [id number]                            ROOM:       0530  ACCOUNT NO:   [de-identified]                           ADMIT DATE: 2020  PROVIDER:     Abhinav Mclaughlin MD    DATE OF PROCEDURE:  2020    PREOPERATIVE DIAGNOSIS:  Acute cholecystitis. POSTOPERATIVE DIAGNOSES:  Gangrenous cholecystitis. PROCEDURE:  Laparoscopic subtotal cholecystectomy. SURGEON:  Abhinav Mclaughlin MD    ANESTHESIA:  General.    COMPLICATIONS:  None. SPECIMEN:  Gallbladder. ESTIMATED BLOOD LOSS:  Minimal.    DISPOSITION:  Monitored floor. DESCRIPTION OF PROCEDURE:  The patient was taken back to the operating  suite. Anesthesia was administered by anesthesia team.  Abdomen was  prepped and draped in sterile fashion. Insufflation needle was placed  through the umbilicus. Abdomen was insufflated to appropriate level. A  5 mm supraumbilical incision was created and trocar was placed. 12-mm  subxiphoid and two right upper quadrant 5-mm trocars were placed under  direct visualization. It was noted that the omentum was attached to the  liver. This was gently retracted and it was noted that the gallbladder  was necrotic with patchy necrosis as well as closely adherent to this  area of the omentum. We bluntly released some of the omentum around the  gallbladder. Very inflamed and scarred-in gallbladder was encountered. We were able to decompress the gallbladder using the decompression  needle and removed the bile. We tried to retract the gallbladder  cranial, but due to the necrotic aspect, it was very difficult to grasp  the gallbladder. Dome-down technique was employed and at this point we  also decided to perform a subtotal cholecystectomy.   We continued to  work laterally as well as medially, working our way towards the  infundibulum. The duodenum was noted to be attached to the  infundibulum. The gallbladder wall was taken above the duodenal  attachments and the portion of the gallbladder was removed. We were not  able to dissect the duodenum off of the gallbladder and due to the risk  of injury, this portion was aborted. We were not able to identify the  cystic duct and at this point stayed with the subtotal cholecystectomy. The area was copiously irrigated. Duodenum was left uninjured. No  other gross abnormalities were noted. Drain was placed in the right  upper quadrant and brought out through the right lateral trocar and  secured. Abdomen was desufflated. All the incisions were closed with  4-0 Monocryl. The patient was woken up, tolerated the procedure well.         Yemi Boyd MD    D: 02/09/2020 15:58:51       T: 02/09/2020 16:00:53     [email or web address]  Job#: [id number]     Doc#: [id number]    CC:

## 2020-02-11 VITALS
TEMPERATURE: 98.2 F | RESPIRATION RATE: 16 BRPM | HEART RATE: 63 BPM | HEIGHT: 76 IN | BODY MASS INDEX: 33.49 KG/M2 | OXYGEN SATURATION: 98 % | SYSTOLIC BLOOD PRESSURE: 132 MMHG | WEIGHT: 275 LBS | DIASTOLIC BLOOD PRESSURE: 78 MMHG

## 2020-02-11 LAB
ALBUMIN SERPL-MCNC: 3.3 G/DL (ref 3.5–5.2)
ALP BLD-CCNC: 43 U/L (ref 40–129)
ALT SERPL-CCNC: 77 U/L (ref 0–40)
AMYLASE: 25 U/L (ref 20–100)
ANION GAP SERPL CALCULATED.3IONS-SCNC: 10 MMOL/L (ref 7–16)
AST SERPL-CCNC: 31 U/L (ref 0–39)
BASOPHILS ABSOLUTE: 0 E9/L (ref 0–0.2)
BASOPHILS RELATIVE PERCENT: 0 % (ref 0–2)
BILIRUB SERPL-MCNC: 0.9 MG/DL (ref 0–1.2)
BUN BLDV-MCNC: 14 MG/DL (ref 8–23)
CALCIUM SERPL-MCNC: 8.5 MG/DL (ref 8.6–10.2)
CHLORIDE BLD-SCNC: 96 MMOL/L (ref 98–107)
CO2: 25 MMOL/L (ref 22–29)
CREAT SERPL-MCNC: 1.1 MG/DL (ref 0.7–1.2)
EOSINOPHILS ABSOLUTE: 0.02 E9/L (ref 0.05–0.5)
EOSINOPHILS RELATIVE PERCENT: 0.2 % (ref 0–6)
GFR AFRICAN AMERICAN: >60
GFR NON-AFRICAN AMERICAN: >60 ML/MIN/1.73
GLUCOSE BLD-MCNC: 170 MG/DL (ref 74–99)
HCT VFR BLD CALC: 38.4 % (ref 37–54)
HEMOGLOBIN: 12.7 G/DL (ref 12.5–16.5)
IMMATURE GRANULOCYTES #: 0.04 E9/L
IMMATURE GRANULOCYTES %: 0.4 % (ref 0–5)
LIPASE: 13 U/L (ref 13–60)
LYMPHOCYTES ABSOLUTE: 1.1 E9/L (ref 1.5–4)
LYMPHOCYTES RELATIVE PERCENT: 12.1 % (ref 20–42)
MCH RBC QN AUTO: 29 PG (ref 26–35)
MCHC RBC AUTO-ENTMCNC: 33.1 % (ref 32–34.5)
MCV RBC AUTO: 87.7 FL (ref 80–99.9)
METER GLUCOSE: 166 MG/DL (ref 74–99)
MONOCYTES ABSOLUTE: 0.86 E9/L (ref 0.1–0.95)
MONOCYTES RELATIVE PERCENT: 9.5 % (ref 2–12)
MRSA CULTURE ONLY: NORMAL
NEUTROPHILS ABSOLUTE: 7.07 E9/L (ref 1.8–7.3)
NEUTROPHILS RELATIVE PERCENT: 77.8 % (ref 43–80)
PDW BLD-RTO: 13.3 FL (ref 11.5–15)
PLATELET # BLD: 164 E9/L (ref 130–450)
PMV BLD AUTO: 9.6 FL (ref 7–12)
POTASSIUM REFLEX MAGNESIUM: 3.8 MMOL/L (ref 3.5–5)
RBC # BLD: 4.38 E12/L (ref 3.8–5.8)
SODIUM BLD-SCNC: 131 MMOL/L (ref 132–146)
TOTAL PROTEIN: 6.1 G/DL (ref 6.4–8.3)
WBC # BLD: 9.1 E9/L (ref 4.5–11.5)

## 2020-02-11 PROCEDURE — 82962 GLUCOSE BLOOD TEST: CPT

## 2020-02-11 PROCEDURE — 82150 ASSAY OF AMYLASE: CPT

## 2020-02-11 PROCEDURE — 80053 COMPREHEN METABOLIC PANEL: CPT

## 2020-02-11 PROCEDURE — 6370000000 HC RX 637 (ALT 250 FOR IP): Performed by: SURGERY

## 2020-02-11 PROCEDURE — 36415 COLL VENOUS BLD VENIPUNCTURE: CPT

## 2020-02-11 PROCEDURE — 2500000003 HC RX 250 WO HCPCS: Performed by: SURGERY

## 2020-02-11 PROCEDURE — 6360000002 HC RX W HCPCS: Performed by: INTERNAL MEDICINE

## 2020-02-11 PROCEDURE — 83690 ASSAY OF LIPASE: CPT

## 2020-02-11 PROCEDURE — 2580000003 HC RX 258: Performed by: INTERNAL MEDICINE

## 2020-02-11 PROCEDURE — 6370000000 HC RX 637 (ALT 250 FOR IP): Performed by: INTERNAL MEDICINE

## 2020-02-11 PROCEDURE — 85025 COMPLETE CBC W/AUTO DIFF WBC: CPT

## 2020-02-11 RX ORDER — HYDROCODONE BITARTRATE AND ACETAMINOPHEN 5; 325 MG/1; MG/1
1 TABLET ORAL EVERY 6 HOURS PRN
Qty: 10 TABLET | Refills: 0 | Status: SHIPPED | OUTPATIENT
Start: 2020-02-11 | End: 2020-02-16

## 2020-02-11 RX ADMIN — INSULIN LISPRO 1 UNITS: 100 INJECTION, SOLUTION INTRAVENOUS; SUBCUTANEOUS at 08:26

## 2020-02-11 RX ADMIN — Medication 10 ML: at 08:28

## 2020-02-11 RX ADMIN — MORPHINE SULFATE 2 MG: 2 INJECTION, SOLUTION INTRAMUSCULAR; INTRAVENOUS at 05:51

## 2020-02-11 RX ADMIN — FAMOTIDINE 20 MG: 20 TABLET, FILM COATED ORAL at 08:26

## 2020-02-11 RX ADMIN — METRONIDAZOLE 500 MG: 500 INJECTION, SOLUTION INTRAVENOUS at 03:18

## 2020-02-11 RX ADMIN — HYDROCODONE BITARTRATE AND ACETAMINOPHEN 1 TABLET: 5; 325 TABLET ORAL at 05:51

## 2020-02-11 RX ADMIN — POTASSIUM CHLORIDE, DEXTROSE MONOHYDRATE AND SODIUM CHLORIDE: 150; 5; 450 INJECTION, SOLUTION INTRAVENOUS at 01:11

## 2020-02-11 ASSESSMENT — PAIN SCALES - GENERAL: PAINLEVEL_OUTOF10: 7

## 2020-02-11 NOTE — PROGRESS NOTES
PROGRESS NOTE    Patient Presents with/Seen in Consultation For      *Reason for Consult: Acute Cholecystitis, elevated LFTs.     CHIEF COMPLAINT:  epigastric and back pain, nausea, vomiting, chills, and subjective fever    Subjective:     Patient seen laying in bed, in NAD. States he is going home today. Reports to feeling good. Tolerating diet. Denies any N/V. [place] abdominal discomfort, as to be expected. Had a BM today and yesterday, \"normal\". POC reviewed with the patient, all questions answered, states understanding. Review of Systems  Aside from what was mentioned in the PMH and HPI, essentially unremarkable, all others negative. Objective:     Patient Vitals for the past 8 hrs:   BP Temp Temp src Pulse Resp SpO2 Weight   02/11/20 0800 132/78 98.2 °F (36.8 °C) Oral 63 16 98 % --   02/11/20 0412 -- -- -- -- -- -- 275 lb (124.7 kg)       General appearance: alert, awake, in bed, and cooperative  Eyes: conjunctivae/corneas clear. PERRL.   Lungs: clear/ diminshed to auscultation bilaterally  Heart: regular rate and rhythm, no murmur, 2+ pulses; without edema  Abdomen: soft, appropriate post- op tenderness to palpitation, lap sites D/I dermabond- intact; hypo bowel sounds, SHEFALI- intact large amount of bilious drainage seen  Extremities: extremities without edema  Pulses: 2+ and symmetric  Skin: Skin color, texture, turgor normal.   Neurologic: Grossly normal    insulin lispro (HUMALOG) injection vial 0-6 Units, TID WC  insulin lispro (HUMALOG) injection vial 0-3 Units, Nightly  glucose (GLUTOSE) 40 % oral gel 15 g, PRN  dextrose 50 % IV solution, PRN  glucagon (rDNA) injection 1 mg, PRN  dextrose 5 % solution, PRN  HYDROmorphone (DILAUDID) injection 1 mg, Q3H PRN  cefTRIAXone (ROCEPHIN) 1 g in dextrose 5 % 50 mL IVPB (vial-mate), Q24H  metronidazole (FLAGYL) 500 mg in NaCl 100 mL IVPB premix, Q8H  HYDROcodone-acetaminophen (NORCO) 5-325 MG per tablet 1 tablet, Q6H PRN  sodium chloride flush 0.9 % injection 10

## 2020-02-11 NOTE — PATIENT CARE CONFERENCE
[facility] Quality Flow/Interdisciplinary Rounds Progress Note        Quality Flow Rounds held on February 11, 2020    Disciplines Attending:  Bedside Nurse, ,  and Nursing Unit Leadership    Blake Yap III was admitted on 2/8/2020  2:06 PM    Anticipated Discharge Date:  Expected Discharge Date: 02/10/20    Disposition:    Too Score:  Too Scale Score: 22    Readmission Score:         Discussed patient goal for the day, patient clinical progression, and barriers to discharge.   The following Goal(s) of the Day/Commitment(s) have been identified:  discharge planning       Grace Han  February 11, 2020

## 2020-02-11 NOTE — PROGRESS NOTES
Subjective: The patient is awake and alert. No problems overnight. Denies chest pain, angina, and dyspnea. Decreased post-surgical abdominal discomfort. Tolerating diet. No nausea or vomiting. Doing much better than yesterday. + BM's yesterday. Objective:    /75   Pulse 74   Temp 99.4 °F (37.4 °C) (Oral)   Resp 18   Ht 6' 4\" (1.93 m)   Wt 275 lb (124.7 kg)   SpO2 95%   BMI 33.47 kg/m²   Neck: No goiter, bruit, or LA  Heart:  RRR, no murmurs, gallops, or rubs.   Lungs:  CTA bilaterally, no wheeze, rales or rhonchi  Abd: bowel sounds present, tender RUQ area, No G/R,, nondistended, no masses  Extrem:  No clubbing, cyanosis, or edema, 2+ peripheral pulses, FROM    CBC:   Lab Results   Component Value Date    WBC 9.1 02/11/2020    RBC 4.38 02/11/2020    HGB 12.7 02/11/2020    HCT 38.4 02/11/2020    MCV 87.7 02/11/2020    MCH 29.0 02/11/2020    MCHC 33.1 02/11/2020    RDW 13.3 02/11/2020     02/11/2020    MPV 9.6 02/11/2020     CMP:    Lab Results   Component Value Date     02/11/2020    K 3.8 02/11/2020    CL 96 02/11/2020    CO2 25 02/11/2020    BUN 14 02/11/2020    CREATININE 1.1 02/11/2020    GFRAA >60 02/11/2020    LABGLOM >60 02/11/2020    GLUCOSE 170 02/11/2020    PROT 6.1 02/11/2020    LABALBU 3.3 02/11/2020    CALCIUM 8.5 02/11/2020    BILITOT 0.9 02/11/2020    ALKPHOS 43 02/11/2020    AST 31 02/11/2020    ALT 77 02/11/2020          Current Facility-Administered Medications:     insulin lispro (HUMALOG) injection vial 0-6 Units, 0-6 Units, Subcutaneous, TID WC, Robbin Vazquez MD, 1 Units at 02/10/20 1732    insulin lispro (HUMALOG) injection vial 0-3 Units, 0-3 Units, Subcutaneous, Nightly, Robbin Deed, MD, 1 Units at 02/10/20 2052    glucose (GLUTOSE) 40 % oral gel 15 g, 15 g, Oral, PRN, Robbin Vazquez MD    dextrose 50 % IV solution, 12.5 g, Intravenous, PRN, Robbin Vazquez MD    glucagon (rDNA) injection 1 mg, 1 mg, Intramuscular, PRN, Robbin Vazquez, MD    dextrose 5 % solution, 100 mL/hr, Intravenous, PRN, Jerrell Quijano MD    dextrose 5 % and 0.45 % NaCl with KCl 20 mEq infusion, , Intravenous, Continuous, Rosio Arroyo MD, Last Rate: 100 mL/hr at 02/11/20 0111    HYDROmorphone (DILAUDID) injection 1 mg, 1 mg, Intravenous, Q3H PRN, Rosio Arroyo MD, 1 mg at 02/10/20 1846    cefTRIAXone (ROCEPHIN) 1 g in dextrose 5 % 50 mL IVPB (vial-mate), 1 g, Intravenous, Q24H, Rosio Arroyo MD, Stopped at 02/10/20 1806    metronidazole (FLAGYL) 500 mg in NaCl 100 mL IVPB premix, 500 mg, Intravenous, Q8H, Rosio Arroyo MD, Stopped at 02/11/20 0418    HYDROcodone-acetaminophen (NORCO) 5-325 MG per tablet 1 tablet, 1 tablet, Oral, Q6H PRN, Rosio Arroyo MD, 1 tablet at 02/11/20 0551    sodium chloride flush 0.9 % injection 10 mL, 10 mL, Intravenous, 2 times per day, Hemalatha HILTON Olesya, DO, 10 mL at 02/10/20 2253    sodium chloride flush 0.9 % injection 10 mL, 10 mL, Intravenous, PRN, Hemalatha HILTON Olesya, DO, 10 mL at 02/09/20 1723    potassium chloride (KLOR-CON M) extended release tablet 40 mEq, 40 mEq, Oral, PRN **OR** potassium bicarb-citric acid (EFFER-K) effervescent tablet 40 mEq, 40 mEq, Oral, PRN **OR** potassium chloride 10 mEq/100 mL IVPB (Peripheral Line), 10 mEq, Intravenous, PRN, Hemalatha E Olesya, DO    senna (SENOKOT) tablet 8.6 mg, 1 tablet, Oral, Daily PRN, Hemalatha HILTON Olesya, DO, 8.6 mg at 02/10/20 1819    ondansetron (ZOFRAN) injection 4 mg, 4 mg, Intravenous, Q6H PRN, Hemalatha HILTON Olesya, DO    famotidine (PEPCID) tablet 20 mg, 20 mg, Oral, BID, Hemalatha HILTON Olesya, DO, 20 mg at 02/10/20 2051    enoxaparin (LOVENOX) injection 40 mg, 40 mg, Subcutaneous, Daily, Hemalatha Dacosta DO, 40 mg at 02/08/20 1939    acetaminophen (TYLENOL) tablet 650 mg, 650 mg, Oral, Q4H PRN, Hemalatha Dacosta DO    0.9 % sodium chloride infusion, , Intravenous, Continuous, Hemalatha Dacosta DO, Stopped at 02/10/20 0458    morphine (PF) injection 2 mg, 2 mg,

## 2020-02-11 NOTE — DISCHARGE SUMMARY
[id number] [address]                               DISCHARGE SUMMARY    PATIENT NAME: Deanna Mackey                     :        1955  MED REC NO:   [id number]                            ROOM:       0530  ACCOUNT NO:   [de-identified]                           ADMIT DATE: 2020  PROVIDER:     Evan Boyd MD                  [facility], [place], operated by [facility] DATE: 2020    DATE OF ADMISSION:  2020    DATE OF DISCHARGE:  2020    FINAL DIAGNOSES:  1. Acute cholecystitis. 2.  Diabetes. 3.  Atherosclerotic heart disease. 4.  Hypertension. 5.  Hyponatremia. COURSE OF ILLNESS:  This is a 71-year-old gentleman who was admitted  with right upper quadrant pain and subsequently found to have  cholelithiasis. He was taken to surgery, where he underwent a  laparoscopic cholecystectomy. There were no significant complications  perioperatively or postoperatively. The patient's abdominal pain  improved over the next couple of days after surgery. He did have bowel  movements and was tolerating his diet without nausea or vomiting. He  voiced no other complaints throughout the admission. At the time of  discharge, his sodium was noted to be slightly low at 131. I suspect he  has been drinking lots of fluids with very little food intake at this  point and suspect with _____ of his IV fluids and resumption of _____  his sodium will improve. I have instructed the patient that he will  need to check his sodium two days after discharge to assure that this is  indeed improving. His diabetes is well controlled throughout the  admission, but the patient did express that metformin has been causing  him diarrhea.   At this point, he is on only a low dose of metformin 500  mg daily and I have instructed him to stop the metformin and we will  follow his blood sugars one to two times a day while off it to see where  his sugars end up

## (undated) DEVICE — DRAIN SURG 15FR SIL RND CHN W/ TRCR FULL FLUT DBL WRP TRAD

## (undated) DEVICE — GOWN,SIRUS,FABRNF,XL,20/CS: Brand: MEDLINE

## (undated) DEVICE — GLOVE SURG SZ 75 CRM LTX FREE POLYISOPRENE POLYMER BEAD ANTI

## (undated) DEVICE — DOUBLE BASIN SET: Brand: MEDLINE INDUSTRIES, INC.

## (undated) DEVICE — Z DISCONTINUED NO SUB IDED BAG SPEC RETRV M C240ML MOUTH 7.3MM L17CM SHFT 10MM NYL EZEE

## (undated) DEVICE — COVER HNDL LT DISP

## (undated) DEVICE — TROCAR: Brand: KII® SLEEVE

## (undated) DEVICE — Device

## (undated) DEVICE — Z DUP USE 2257490 ADHESIVE SKIN CLSRE 036ML TPCL 2CTL CNCRLTE HIGH VSCSTY DRMB

## (undated) DEVICE — INSUFFLATION TUBING SET WITH FILTER, FUNNEL CONNECTOR AND LUER LOCK: Brand: JOSNOE MEDICAL INC

## (undated) DEVICE — PACK PROCEDURE SURG GEN CUST

## (undated) DEVICE — DRAPE,CHEST,FENES,15X10,STERIL: Brand: MEDLINE

## (undated) DEVICE — SPONGE,DRAIN,NONWVN,4"X4",6PLY,STRL,LF: Brand: MEDLINE

## (undated) DEVICE — BLADE CLIPPER GEN PURP NS

## (undated) DEVICE — SOLUTION IV IRRIG POUR BRL 0.9% SODIUM CHL 2F7124

## (undated) DEVICE — SEALER ENDOSCP NANO COAT OPN DIV CRV L JAW LIGASURE IMPACT

## (undated) DEVICE — KIT,ANTI FOG,W/SPONGE & FLUID,SOFT PACK: Brand: MEDLINE

## (undated) DEVICE — TROCAR: Brand: KII FIOS FIRST ENTRY

## (undated) DEVICE — CHLORAPREP 26ML ORANGE

## (undated) DEVICE — ELECTRODE PT RET AD L9FT HI MOIST COND ADH HYDRGEL CORDED

## (undated) DEVICE — NEEDLE HYPO 25GA L1.5IN BLU POLYPR HUB S STL REG BVL STR

## (undated) DEVICE — INSUFFLATION NEEDLE TO ESTABLISH PNEUMOPERITONEUM.: Brand: INSUFFLATION NEEDLE

## (undated) DEVICE — TROCAR: Brand: KII SHIELDED BLADED ACCESS SYSTEM

## (undated) DEVICE — ELECTRODE ES 36CM LAP FLAT L HK COAT DISP CLEANCOAT

## (undated) DEVICE — SYRINGE 20ML LL S/C 50

## (undated) DEVICE — 4-PORT MANIFOLD: Brand: NEPTUNE 2

## (undated) DEVICE — NEEDLE CLOSURE OMNICLOSE

## (undated) DEVICE — SOLUTION IRRIG 2000ML 0.9% SOD CHL USP UROMATIC PLAS CONT

## (undated) DEVICE — PUMP SUC IRR TBNG L10FT W/ HNDPC ASSEMB STRYKEFLOW 2

## (undated) DEVICE — INTENDED FOR TISSUE SEPARATION, AND OTHER PROCEDURES THAT REQUIRE A SHARP SURGICAL BLADE TO PUNCTURE OR CUT.: Brand: BARD-PARKER ® STAINLESS STEEL BLADES

## (undated) DEVICE — SCISSORS ENDOSCP DIA5MM CRV MPLR CAUT W/ RATCH HNDL

## (undated) DEVICE — TOWEL,OR,DSP,ST,BLUE,STD,6/PK,12PK/CS: Brand: MEDLINE